# Patient Record
Sex: FEMALE | Race: WHITE | NOT HISPANIC OR LATINO | Employment: FULL TIME | URBAN - METROPOLITAN AREA
[De-identification: names, ages, dates, MRNs, and addresses within clinical notes are randomized per-mention and may not be internally consistent; named-entity substitution may affect disease eponyms.]

---

## 2017-05-08 ENCOUNTER — HOSPITAL ENCOUNTER (OUTPATIENT)
Dept: SLEEP MEDICINE | Facility: HOSPITAL | Age: 32
Discharge: HOME | End: 2017-05-08
Attending: INTERNAL MEDICINE | Admitting: INTERNAL MEDICINE

## 2017-05-08 ENCOUNTER — HOSPITAL ENCOUNTER (OUTPATIENT)
Dept: CT IMAGING | Facility: HOSPITAL | Age: 32
Discharge: HOME | End: 2017-05-08
Attending: INTERNAL MEDICINE | Admitting: INTERNAL MEDICINE

## 2017-06-05 ENCOUNTER — HOSPITAL ENCOUNTER (OUTPATIENT)
Dept: SLEEP MEDICINE | Facility: HOSPITAL | Age: 32
Discharge: HOME | End: 2017-06-05
Attending: INTERNAL MEDICINE | Admitting: INTERNAL MEDICINE

## 2017-07-20 ENCOUNTER — OFFICE (AMBULATORY)
Dept: URBAN - METROPOLITAN AREA CLINIC 64 | Facility: CLINIC | Age: 32
End: 2017-07-20
Payer: COMMERCIAL

## 2017-07-20 VITALS
DIASTOLIC BLOOD PRESSURE: 69 MMHG | HEART RATE: 70 BPM | HEIGHT: 68 IN | WEIGHT: 139 LBS | SYSTOLIC BLOOD PRESSURE: 104 MMHG

## 2017-07-20 DIAGNOSIS — R11.2 NAUSEA WITH VOMITING, UNSPECIFIED: ICD-10-CM

## 2017-07-20 DIAGNOSIS — R19.5 OTHER FECAL ABNORMALITIES: ICD-10-CM

## 2017-07-20 DIAGNOSIS — R63.4 ABNORMAL WEIGHT LOSS: ICD-10-CM

## 2017-07-20 DIAGNOSIS — R19.7 DIARRHEA, UNSPECIFIED: ICD-10-CM

## 2017-07-20 DIAGNOSIS — R10.10 UPPER ABDOMINAL PAIN, UNSPECIFIED: ICD-10-CM

## 2017-07-20 LAB
AMYLASE, SERUM: 48 U/L (ref 31–124)
C-REACTIVE PROTEIN, QUANT: 0.7 MG/L (ref 0–4.9)
CBC WITH DIFFERENTIAL/PLATELET: BASO (ABSOLUTE): 0 X10E3/UL (ref 0–0.2)
CBC WITH DIFFERENTIAL/PLATELET: BASOS: 0 %
CBC WITH DIFFERENTIAL/PLATELET: EOS (ABSOLUTE): 0.1 X10E3/UL (ref 0–0.4)
CBC WITH DIFFERENTIAL/PLATELET: EOS: 1 %
CBC WITH DIFFERENTIAL/PLATELET: HEMATOCRIT: 36.3 % (ref 34–46.6)
CBC WITH DIFFERENTIAL/PLATELET: HEMATOLOGY COMMENTS: (no result)
CBC WITH DIFFERENTIAL/PLATELET: HEMOGLOBIN: 12.4 G/DL (ref 11.1–15.9)
CBC WITH DIFFERENTIAL/PLATELET: IMMATURE CELLS: (no result)
CBC WITH DIFFERENTIAL/PLATELET: IMMATURE GRANS (ABS): 0 X10E3/UL (ref 0–0.1)
CBC WITH DIFFERENTIAL/PLATELET: IMMATURE GRANULOCYTES: 0 %
CBC WITH DIFFERENTIAL/PLATELET: LYMPHS (ABSOLUTE): 3.6 X10E3/UL — HIGH (ref 0.7–3.1)
CBC WITH DIFFERENTIAL/PLATELET: LYMPHS: 36 %
CBC WITH DIFFERENTIAL/PLATELET: MCH: 30.1 PG (ref 26.6–33)
CBC WITH DIFFERENTIAL/PLATELET: MCHC: 34.2 G/DL (ref 31.5–35.7)
CBC WITH DIFFERENTIAL/PLATELET: MCV: 88 FL (ref 79–97)
CBC WITH DIFFERENTIAL/PLATELET: MONOCYTES(ABSOLUTE): 0.5 X10E3/UL (ref 0.1–0.9)
CBC WITH DIFFERENTIAL/PLATELET: MONOCYTES: 6 %
CBC WITH DIFFERENTIAL/PLATELET: NEUTROPHILS (ABSOLUTE): 5.6 X10E3/UL (ref 1.4–7)
CBC WITH DIFFERENTIAL/PLATELET: NEUTROPHILS: 57 %
CBC WITH DIFFERENTIAL/PLATELET: NRBC: (no result)
CBC WITH DIFFERENTIAL/PLATELET: PLATELETS: 316 X10E3/UL (ref 150–379)
CBC WITH DIFFERENTIAL/PLATELET: RBC: 4.12 X10E6/UL (ref 3.77–5.28)
CBC WITH DIFFERENTIAL/PLATELET: RDW: 13.2 % (ref 12.3–15.4)
CBC WITH DIFFERENTIAL/PLATELET: WBC: 9.9 X10E3/UL (ref 3.4–10.8)
COMP. METABOLIC PANEL (14): A/G RATIO: 1.7 (ref 1.2–2.2)
COMP. METABOLIC PANEL (14): ALBUMIN, SERUM: 4.8 G/DL (ref 3.5–5.5)
COMP. METABOLIC PANEL (14): ALKALINE PHOSPHATASE, S: 45 IU/L (ref 39–117)
COMP. METABOLIC PANEL (14): ALT (SGPT): 18 IU/L (ref 0–32)
COMP. METABOLIC PANEL (14): AST (SGOT): 20 IU/L (ref 0–40)
COMP. METABOLIC PANEL (14): BILIRUBIN, TOTAL: 1.1 MG/DL (ref 0–1.2)
COMP. METABOLIC PANEL (14): BUN/CREATININE RATIO: 15 (ref 9–23)
COMP. METABOLIC PANEL (14): BUN: 8 MG/DL (ref 6–20)
COMP. METABOLIC PANEL (14): CALCIUM, SERUM: 9 MG/DL (ref 8.7–10.2)
COMP. METABOLIC PANEL (14): CARBON DIOXIDE, TOTAL: 21 MMOL/L (ref 18–29)
COMP. METABOLIC PANEL (14): CHLORIDE, SERUM: 99 MMOL/L (ref 96–106)
COMP. METABOLIC PANEL (14): CREATININE, SERUM: 0.53 MG/DL — LOW (ref 0.57–1)
COMP. METABOLIC PANEL (14): EGFR IF AFRICN AM: 148 ML/MIN/1.73 (ref 59–?)
COMP. METABOLIC PANEL (14): EGFR IF NONAFRICN AM: 129 ML/MIN/1.73 (ref 59–?)
COMP. METABOLIC PANEL (14): GLOBULIN, TOTAL: 2.8 G/DL (ref 1.5–4.5)
COMP. METABOLIC PANEL (14): GLUCOSE, SERUM: 86 MG/DL (ref 65–99)
COMP. METABOLIC PANEL (14): POTASSIUM, SERUM: 4.4 MMOL/L (ref 3.5–5.2)
COMP. METABOLIC PANEL (14): PROTEIN, TOTAL, SERUM: 7.6 G/DL (ref 6–8.5)
COMP. METABOLIC PANEL (14): SODIUM, SERUM: 139 MMOL/L (ref 134–144)
LIPASE, SERUM: 24 U/L (ref 0–59)

## 2017-07-20 PROCEDURE — 99243 OFF/OP CNSLTJ NEW/EST LOW 30: CPT | Performed by: NURSE PRACTITIONER

## 2017-07-20 RX ORDER — OMEPRAZOLE 40 MG/1
40 CAPSULE, DELAYED RELEASE ORAL
Qty: 30 | Refills: 11 | Status: COMPLETED
Start: 2017-07-20 | End: 2019-03-27

## 2017-07-20 RX ORDER — COLESTIPOL HYDROCHLORIDE 1 G/1
2 TABLET, FILM COATED ORAL
Qty: 60 | Refills: 11 | Status: COMPLETED
Start: 2017-07-20 | End: 2019-03-27

## 2017-07-21 ENCOUNTER — ON CAMPUS - OUTPATIENT (AMBULATORY)
Dept: URBAN - METROPOLITAN AREA HOSPITAL 2 | Facility: HOSPITAL | Age: 32
End: 2017-07-21
Payer: COMMERCIAL

## 2017-07-21 ENCOUNTER — OFFICE (AMBULATORY)
Dept: URBAN - METROPOLITAN AREA CLINIC 64 | Facility: CLINIC | Age: 32
End: 2017-07-21
Payer: COMMERCIAL

## 2017-07-21 VITALS
OXYGEN SATURATION: 98 % | SYSTOLIC BLOOD PRESSURE: 104 MMHG | TEMPERATURE: 99.2 F | DIASTOLIC BLOOD PRESSURE: 72 MMHG | HEART RATE: 75 BPM | RESPIRATION RATE: 12 BRPM | RESPIRATION RATE: 18 BRPM | SYSTOLIC BLOOD PRESSURE: 120 MMHG | DIASTOLIC BLOOD PRESSURE: 65 MMHG | DIASTOLIC BLOOD PRESSURE: 64 MMHG | DIASTOLIC BLOOD PRESSURE: 53 MMHG | HEART RATE: 72 BPM | RESPIRATION RATE: 16 BRPM | HEART RATE: 87 BPM | HEART RATE: 79 BPM | DIASTOLIC BLOOD PRESSURE: 62 MMHG | SYSTOLIC BLOOD PRESSURE: 102 MMHG | WEIGHT: 135 LBS | SYSTOLIC BLOOD PRESSURE: 87 MMHG | OXYGEN SATURATION: 100 % | SYSTOLIC BLOOD PRESSURE: 93 MMHG | HEART RATE: 71 BPM | HEIGHT: 68 IN | OXYGEN SATURATION: 99 %

## 2017-07-21 DIAGNOSIS — R11.2 NAUSEA WITH VOMITING, UNSPECIFIED: ICD-10-CM

## 2017-07-21 DIAGNOSIS — R63.4 ABNORMAL WEIGHT LOSS: ICD-10-CM

## 2017-07-21 DIAGNOSIS — K29.70 GASTRITIS, UNSPECIFIED, WITHOUT BLEEDING: ICD-10-CM

## 2017-07-21 DIAGNOSIS — R19.5 OTHER FECAL ABNORMALITIES: ICD-10-CM

## 2017-07-21 DIAGNOSIS — R10.10 UPPER ABDOMINAL PAIN, UNSPECIFIED: ICD-10-CM

## 2017-07-21 LAB
GI HISTOLOGY: A. SELECT: (no result)
GI HISTOLOGY: PDF REPORT: (no result)

## 2017-07-21 PROCEDURE — 43239 EGD BIOPSY SINGLE/MULTIPLE: CPT | Performed by: INTERNAL MEDICINE

## 2017-07-21 PROCEDURE — 88305 TISSUE EXAM BY PATHOLOGIST: CPT | Performed by: INTERNAL MEDICINE

## 2017-07-21 RX ADMIN — PROPOFOL: 10 INJECTION, EMULSION INTRAVENOUS at 11:12

## 2017-07-24 ENCOUNTER — HOSPITAL ENCOUNTER (OUTPATIENT)
Dept: NUCLEAR MEDICINE | Facility: HOSPITAL | Age: 32
Discharge: HOME | End: 2017-07-24
Attending: INTERNAL MEDICINE | Admitting: INTERNAL MEDICINE

## 2017-08-17 ENCOUNTER — ON CAMPUS - OUTPATIENT (AMBULATORY)
Dept: URBAN - METROPOLITAN AREA HOSPITAL 2 | Facility: HOSPITAL | Age: 32
End: 2017-08-17
Payer: COMMERCIAL

## 2017-08-17 VITALS
DIASTOLIC BLOOD PRESSURE: 53 MMHG | HEART RATE: 103 BPM | SYSTOLIC BLOOD PRESSURE: 83 MMHG | OXYGEN SATURATION: 95 % | TEMPERATURE: 97.8 F | SYSTOLIC BLOOD PRESSURE: 87 MMHG | SYSTOLIC BLOOD PRESSURE: 123 MMHG | SYSTOLIC BLOOD PRESSURE: 124 MMHG | DIASTOLIC BLOOD PRESSURE: 60 MMHG | RESPIRATION RATE: 15 BRPM | HEART RATE: 101 BPM | SYSTOLIC BLOOD PRESSURE: 86 MMHG | SYSTOLIC BLOOD PRESSURE: 99 MMHG | DIASTOLIC BLOOD PRESSURE: 56 MMHG | HEART RATE: 91 BPM | DIASTOLIC BLOOD PRESSURE: 69 MMHG | DIASTOLIC BLOOD PRESSURE: 61 MMHG | HEART RATE: 85 BPM | HEART RATE: 94 BPM | DIASTOLIC BLOOD PRESSURE: 82 MMHG | SYSTOLIC BLOOD PRESSURE: 95 MMHG | HEART RATE: 95 BPM | WEIGHT: 135 LBS | HEART RATE: 88 BPM | DIASTOLIC BLOOD PRESSURE: 57 MMHG | RESPIRATION RATE: 18 BRPM | HEART RATE: 97 BPM | HEART RATE: 77 BPM | HEIGHT: 68 IN | OXYGEN SATURATION: 99 % | RESPIRATION RATE: 16 BRPM | OXYGEN SATURATION: 98 % | DIASTOLIC BLOOD PRESSURE: 68 MMHG | OXYGEN SATURATION: 97 % | OXYGEN SATURATION: 96 %

## 2017-08-17 DIAGNOSIS — K64.8 OTHER HEMORRHOIDS: ICD-10-CM

## 2017-08-17 DIAGNOSIS — K59.1 FUNCTIONAL DIARRHEA: ICD-10-CM

## 2017-08-17 LAB
GI HISTOLOGY: A. UNSPECIFIED: (no result)
GI HISTOLOGY: PDF REPORT: (no result)

## 2017-08-17 PROCEDURE — 45380 COLONOSCOPY AND BIOPSY: CPT | Performed by: INTERNAL MEDICINE

## 2017-08-17 RX ADMIN — PROPOFOL: 10 INJECTION, EMULSION INTRAVENOUS at 13:09

## 2017-12-30 ENCOUNTER — HOSPITAL ENCOUNTER (OUTPATIENT)
Dept: RESPIRATORY THERAPY | Facility: HOSPITAL | Age: 32
Discharge: HOME | End: 2017-12-30
Attending: INTERNAL MEDICINE | Admitting: INTERNAL MEDICINE

## 2018-01-26 ENCOUNTER — HOSPITAL ENCOUNTER (OUTPATIENT)
Dept: CARDIOLOGY | Facility: HOSPITAL | Age: 33
Discharge: HOME | End: 2018-01-26
Attending: INTERNAL MEDICINE | Admitting: INTERNAL MEDICINE

## 2018-05-21 ENCOUNTER — INPATIENT HOSPITAL (AMBULATORY)
Dept: URBAN - METROPOLITAN AREA HOSPITAL 84 | Facility: HOSPITAL | Age: 33
End: 2018-05-21
Payer: COMMERCIAL

## 2018-05-21 DIAGNOSIS — R63.4 ABNORMAL WEIGHT LOSS: ICD-10-CM

## 2018-05-21 DIAGNOSIS — R10.9 UNSPECIFIED ABDOMINAL PAIN: ICD-10-CM

## 2018-05-21 DIAGNOSIS — R11.2 NAUSEA WITH VOMITING, UNSPECIFIED: ICD-10-CM

## 2018-05-21 DIAGNOSIS — K31.84 GASTROPARESIS: ICD-10-CM

## 2018-05-21 PROCEDURE — 99254 IP/OBS CNSLTJ NEW/EST MOD 60: CPT | Performed by: NURSE PRACTITIONER

## 2018-05-22 ENCOUNTER — INPATIENT HOSPITAL (AMBULATORY)
Dept: URBAN - METROPOLITAN AREA HOSPITAL 84 | Facility: HOSPITAL | Age: 33
End: 2018-05-22
Payer: COMMERCIAL

## 2018-05-22 DIAGNOSIS — R11.2 NAUSEA WITH VOMITING, UNSPECIFIED: ICD-10-CM

## 2018-05-22 DIAGNOSIS — R10.9 UNSPECIFIED ABDOMINAL PAIN: ICD-10-CM

## 2018-05-22 DIAGNOSIS — K31.84 GASTROPARESIS: ICD-10-CM

## 2018-05-22 DIAGNOSIS — R63.4 ABNORMAL WEIGHT LOSS: ICD-10-CM

## 2018-05-22 PROCEDURE — 99231 SBSQ HOSP IP/OBS SF/LOW 25: CPT | Performed by: NURSE PRACTITIONER

## 2018-09-13 ENCOUNTER — TRANSCRIBE ORDERS (OUTPATIENT)
Dept: CARDIOLOGY | Facility: CLINIC | Age: 33
End: 2018-09-13

## 2019-03-27 ENCOUNTER — OFFICE (AMBULATORY)
Dept: URBAN - METROPOLITAN AREA CLINIC 64 | Facility: CLINIC | Age: 34
End: 2019-03-27

## 2019-03-27 VITALS
HEART RATE: 95 BPM | WEIGHT: 160 LBS | HEIGHT: 68 IN | SYSTOLIC BLOOD PRESSURE: 100 MMHG | DIASTOLIC BLOOD PRESSURE: 74 MMHG

## 2019-03-27 DIAGNOSIS — K59.00 CONSTIPATION, UNSPECIFIED: ICD-10-CM

## 2019-03-27 DIAGNOSIS — R11.2 NAUSEA WITH VOMITING, UNSPECIFIED: ICD-10-CM

## 2019-03-27 DIAGNOSIS — R19.6 HALITOSIS: ICD-10-CM

## 2019-03-27 DIAGNOSIS — R10.11 RIGHT UPPER QUADRANT PAIN: ICD-10-CM

## 2019-03-27 PROCEDURE — 99214 OFFICE O/P EST MOD 30 MIN: CPT | Performed by: NURSE PRACTITIONER

## 2019-03-27 RX ORDER — DEXLANSOPRAZOLE 60 MG/1
60 CAPSULE, DELAYED RELEASE ORAL
Qty: 90 | Refills: 3 | Status: COMPLETED
End: 2023-04-21

## 2019-03-27 RX ORDER — HYOSCYAMINE SULFATE 0.12 MG/1
0.5 TABLET ORAL; SUBLINGUAL
Qty: 60 | Refills: 1 | Status: COMPLETED
Start: 2019-03-27 | End: 2023-04-21

## 2019-04-04 ENCOUNTER — ON CAMPUS - OUTPATIENT (AMBULATORY)
Dept: URBAN - METROPOLITAN AREA HOSPITAL 2 | Facility: HOSPITAL | Age: 34
End: 2019-04-04

## 2019-04-04 ENCOUNTER — OFFICE (AMBULATORY)
Dept: URBAN - METROPOLITAN AREA PATHOLOGY 4 | Facility: PATHOLOGY | Age: 34
End: 2019-04-04

## 2019-04-04 VITALS
DIASTOLIC BLOOD PRESSURE: 74 MMHG | SYSTOLIC BLOOD PRESSURE: 119 MMHG | HEART RATE: 70 BPM | SYSTOLIC BLOOD PRESSURE: 103 MMHG | WEIGHT: 159.4 LBS | OXYGEN SATURATION: 99 % | TEMPERATURE: 97.4 F | OXYGEN SATURATION: 100 % | HEART RATE: 76 BPM | SYSTOLIC BLOOD PRESSURE: 124 MMHG | HEART RATE: 78 BPM | OXYGEN SATURATION: 98 % | RESPIRATION RATE: 16 BRPM | DIASTOLIC BLOOD PRESSURE: 29 MMHG | HEART RATE: 72 BPM | SYSTOLIC BLOOD PRESSURE: 108 MMHG | HEIGHT: 68 IN | DIASTOLIC BLOOD PRESSURE: 83 MMHG | DIASTOLIC BLOOD PRESSURE: 61 MMHG | HEART RATE: 81 BPM | OXYGEN SATURATION: 97 % | DIASTOLIC BLOOD PRESSURE: 72 MMHG

## 2019-04-04 DIAGNOSIS — K29.50 UNSPECIFIED CHRONIC GASTRITIS WITHOUT BLEEDING: ICD-10-CM

## 2019-04-04 DIAGNOSIS — K29.70 GASTRITIS, UNSPECIFIED, WITHOUT BLEEDING: ICD-10-CM

## 2019-04-04 DIAGNOSIS — R11.2 NAUSEA WITH VOMITING, UNSPECIFIED: ICD-10-CM

## 2019-04-04 DIAGNOSIS — R13.10 DYSPHAGIA, UNSPECIFIED: ICD-10-CM

## 2019-04-04 DIAGNOSIS — K31.9 DISEASE OF STOMACH AND DUODENUM, UNSPECIFIED: ICD-10-CM

## 2019-04-04 LAB
GI HISTOLOGY: A. SELECT: (no result)
GI HISTOLOGY: PDF REPORT: (no result)

## 2019-04-04 PROCEDURE — 88305 TISSUE EXAM BY PATHOLOGIST: CPT | Performed by: INTERNAL MEDICINE

## 2019-04-04 PROCEDURE — 88342 IMHCHEM/IMCYTCHM 1ST ANTB: CPT | Performed by: INTERNAL MEDICINE

## 2019-04-04 PROCEDURE — 43450 DILATE ESOPHAGUS 1/MULT PASS: CPT | Performed by: INTERNAL MEDICINE

## 2019-04-04 PROCEDURE — 43239 EGD BIOPSY SINGLE/MULTIPLE: CPT | Performed by: INTERNAL MEDICINE

## 2019-04-04 NOTE — SERVICEHPINOTES
SANDRA OROZCO  is a  33  female   who presents today for a  EGD   for   the indications listed below. The updated Patient Profile was reviewed prior to the procedure, in conjunction with the Physical Exam, including medical conditions, surgical procedures, medications, allergies, family history and social history. See Physical Exam time stamp below for date and time of HPI completion.Pre-operatively, I reviewed the indication(s) for the procedure, the risks of the procedure [including but not limited to: unexpected bleeding possibly requiring hospitalization and/or unplanned repeat procedures, perforation possibly requiring surgical treatment, missed lesions and complications of sedation/MAC (also explained by anesthesia staff)]. I have evaluated the patient for risks associated with the planned anesthesia and the procedure to be performed and find the patient an acceptable candidate for IV sedation.Multiple opportunities were provided for any questions or concerns, and all questions were answered satisfactorily before any anesthesia was administered. We will proceed with the planned procedure.BR

## 2019-10-04 ENCOUNTER — OFFICE (AMBULATORY)
Dept: URBAN - METROPOLITAN AREA CLINIC 64 | Facility: CLINIC | Age: 34
End: 2019-10-04

## 2019-10-04 VITALS
HEIGHT: 68 IN | SYSTOLIC BLOOD PRESSURE: 112 MMHG | DIASTOLIC BLOOD PRESSURE: 82 MMHG | HEART RATE: 98 BPM | WEIGHT: 141 LBS

## 2019-10-04 DIAGNOSIS — R11.2 NAUSEA WITH VOMITING, UNSPECIFIED: ICD-10-CM

## 2019-10-04 DIAGNOSIS — R10.11 RIGHT UPPER QUADRANT PAIN: ICD-10-CM

## 2019-10-04 DIAGNOSIS — K59.1 FUNCTIONAL DIARRHEA: ICD-10-CM

## 2019-10-04 PROCEDURE — 99215 OFFICE O/P EST HI 40 MIN: CPT | Performed by: NURSE PRACTITIONER

## 2019-10-04 RX ORDER — SUCRALFATE 1 G/10ML
800 SUSPENSION ORAL
Qty: 800 | Refills: 0 | Status: COMPLETED
Start: 2019-10-04 | End: 2023-04-21

## 2019-10-04 RX ORDER — COLESEVELAM HYDROCHLORIDE 625 MG/1
1875 TABLET, FILM COATED ORAL
Qty: 30 | Refills: 11 | Status: COMPLETED
Start: 2019-10-04 | End: 2023-04-21

## 2019-10-04 RX ORDER — AMITRIPTYLINE HYDROCHLORIDE 25 MG/1
25 TABLET, FILM COATED ORAL
Qty: 30 | Refills: 5 | Status: COMPLETED
Start: 2019-10-04 | End: 2023-04-21

## 2019-10-08 ENCOUNTER — OFFICE VISIT (OUTPATIENT)
Dept: SURGERY | Facility: CLINIC | Age: 34
End: 2019-10-08

## 2019-10-08 VITALS
OXYGEN SATURATION: 100 % | SYSTOLIC BLOOD PRESSURE: 112 MMHG | DIASTOLIC BLOOD PRESSURE: 77 MMHG | TEMPERATURE: 97.5 F | HEIGHT: 67 IN | BODY MASS INDEX: 22.44 KG/M2 | HEART RATE: 84 BPM | WEIGHT: 143 LBS

## 2019-10-08 DIAGNOSIS — R10.11 RIGHT UPPER QUADRANT ABDOMINAL PAIN: Primary | ICD-10-CM

## 2019-10-08 PROBLEM — R41.0 TRANSIENT CONFUSION: Status: ACTIVE | Noted: 2019-10-08

## 2019-10-08 PROCEDURE — 99214 OFFICE O/P EST MOD 30 MIN: CPT | Performed by: SURGERY

## 2019-10-08 RX ORDER — DICYCLOMINE HCL 20 MG
TABLET ORAL
COMMUNITY
Start: 2019-09-09

## 2019-10-08 RX ORDER — DULOXETIN HYDROCHLORIDE 60 MG/1
60 CAPSULE, DELAYED RELEASE ORAL DAILY
COMMUNITY
Start: 2018-01-18

## 2019-10-08 RX ORDER — ZOLPIDEM TARTRATE 5 MG/1
5 TABLET ORAL
COMMUNITY

## 2019-10-08 RX ORDER — PANTOPRAZOLE SODIUM 40 MG/1
40 TABLET, DELAYED RELEASE ORAL DAILY
COMMUNITY
Start: 2019-10-01 | End: 2019-10-31

## 2019-10-08 RX ORDER — OXCARBAZEPINE 300 MG/1
TABLET ORAL
COMMUNITY
Start: 2019-10-03

## 2019-10-08 RX ORDER — THEOPHYLLINE 300 MG/1
TABLET, EXTENDED RELEASE ORAL
COMMUNITY
Start: 2018-02-06

## 2019-10-08 RX ORDER — PROMETHAZINE HYDROCHLORIDE 25 MG/1
25 TABLET ORAL EVERY 6 HOURS PRN
COMMUNITY
Start: 2018-06-07

## 2019-10-08 RX ORDER — DIVALPROEX SODIUM 500 MG/1
TABLET, EXTENDED RELEASE ORAL
COMMUNITY
Start: 2019-09-30 | End: 2020-10-21 | Stop reason: SDUPTHER

## 2019-10-08 RX ORDER — SUCRALFATE 1 G/10ML
SUSPENSION ORAL
COMMUNITY
Start: 2019-10-06

## 2019-10-08 RX ORDER — ALPRAZOLAM 0.25 MG/1
TABLET ORAL AS NEEDED
COMMUNITY
Start: 2019-08-26

## 2019-10-08 RX ORDER — MESALAMINE 375 MG/1
CAPSULE, EXTENDED RELEASE ORAL
COMMUNITY
Start: 2019-09-30 | End: 2020-07-29

## 2019-10-08 RX ORDER — AMITRIPTYLINE HYDROCHLORIDE 25 MG/1
TABLET, FILM COATED ORAL
COMMUNITY
Start: 2019-10-04

## 2019-10-08 RX ORDER — DICLOFENAC POTASSIUM 50 MG/1
50 POWDER, FOR SOLUTION ORAL AS NEEDED
COMMUNITY
Start: 2018-10-24 | End: 2020-07-29

## 2019-10-08 RX ORDER — MONTELUKAST SODIUM 10 MG/1
TABLET ORAL
COMMUNITY
Start: 2018-01-18

## 2019-10-08 NOTE — PROGRESS NOTES
"Subjective   Brandi Martinez is a 33 y.o. female.   Chief Complaint   Patient presents with   • Abdominal Pain     Right upper quadrant pain, nausea & vomiting     /77   Pulse 84   Temp 97.5 °F (36.4 °C) (Oral)   Ht 170.2 cm (67.01\")   Wt 64.9 kg (143 lb)   SpO2 100%   BMI 22.39 kg/m²     HISTORY OF PRESENT ILLNESS:  33-year-old lady known to me from a diagnostic laparoscopy last year for right lower quadrant abdominal pain who has been referred back to me for reevaluation.  She has a many year history of right upper quadrant abdominal pain associated with nausea and vomiting and has had upper and lower endoscopy.  She has been diagnosed with gastritis in the past and also has had an emptying study demonstrating gastroparesis.  She has had a cholecystectomy I believe in 2017 by Dr. Wallis and endorses no improvement in her symptoms.  She was told that the gallbladder was normal on pathologic review.  She has had a hysterectomy and last year she was admitted to the hospital for abdominal pain nausea and vomiting and she was found to have a fluid-filled structure in the right lower quadrant so I took her to the operating room for diagnostic laparoscopy removal of a hydrosalpinx incidental appendectomy and did not see any other explanation that could be contributing to her pain.  After her cholecystectomy and her diagnostic laparoscopy removal of hydrosalpinx and appendectomy she denies improvement in her symptoms.    Over the last couple of months she has had worsening of her pain.  She describes it as crippling.  She says it is spontaneous causing her to double over.  It feels like stabbing.  It used to eased up but at this point rarely eases up and is causing chronic abdominal pain.  It begins in the right upper quadrant and radiates down to her right lower quadrant.  She has been unable to eat much food at all she says every time she eats she spits up phone.  She is having loose bowel movements usually " 1-2 times daily.  She has fevers and chills every 3 days.  This pain is also exacerbated by standing.  She had a work-up at [facility] which I only have the comments on from the gastroenterology note which suggest that a CT was performed in early October demonstrated no acute processes other than a tiny hypodense lesion in the liver.  I have been asked to see her again to  her about another diagnostic laparoscopy.      Outpatient Encounter Medications as of 10/8/2019   Medication Sig Dispense Refill   • ALPRAZolam (XANAX) 0.25 MG tablet As Needed.     • amitriptyline (ELAVIL) 25 MG tablet      • APRISO 0.375 g 24 hr capsule      • CARAFATE 1 GM/10ML suspension      • Cetirizine HCl (ZYRTEC ALLERGY PO) As Needed.     • Diclofenac Potassium 50 MG pack Take 50 mg by mouth As Needed.     • dicyclomine (BENTYL) 20 MG tablet      • divalproex (DEPAKOTE) 500 MG 24 hr tablet TAKE 1 TABLET DAILY     • DULoxetine (CYMBALTA) 60 MG capsule Take 60 mg by mouth Daily.     • Erenumab-aooe (AIMOVIG) 70 MG/ML prefilled syringe Inject 70 mg under the skin into the appropriate area as directed.     • montelukast (SINGULAIR) 10 MG tablet TAKE 1 TABLET BY MOUTH EVERY DAY     • OXTELLAR  MG tablet sustained-release 24 hour      • pantoprazole (PROTONIX) 40 MG EC tablet Take 40 mg by mouth Daily.     • promethazine (PHENERGAN) 25 MG tablet Take 25 mg by mouth Every 6 (Six) Hours As Needed.     • theophylline (THEODUR) 300 MG 12 hr tablet TAKE 1 TABLET BY MOUTH EVERY DAY     • tiotropium bromide-olodaterol (STIOLTO RESPIMAT) 2.5-2.5 MCG/ACT aerosol solution inhaler INHALE 2 PUFFS BY MOUTH EVERY DAY     • trimethobenzamide (TIGAN) 300 MG capsule      • zolpidem (AMBIEN) 5 MG tablet Take 5 mg by mouth.       No facility-administered encounter medications on file as of 10/8/2019.          The following portions of the patient's history were reviewed and updated as appropriate: allergies, current medications, past family  history, past medical history, past social history, past surgical history and problem list.    Review of Systems  A complete review of systems been obtained is positive for dizziness loss of weight poor appetite bloating bowel changes diarrhea excessive thirst nausea abdominal pain vomiting back pain and the remainder of the review of systems is negative.  Objective   Physical Exam   Constitutional:   Thin but in no distress   HENT:   Head: Normocephalic and atraumatic.   Eyes: Conjunctivae are normal. No scleral icterus.   Neck: No tracheal deviation present.   Cardiovascular: Normal rate and intact distal pulses.   Pulmonary/Chest: Effort normal. No respiratory distress.   Abdominal: Soft. She exhibits no distension.   Tenderness to palpation along the liver edge in the right upper quadrant the abdomen is soft with no evidence of hernia   Musculoskeletal: She exhibits no edema or deformity.   Lymphadenopathy:     She has no cervical adenopathy.   Neurological: She is alert. No cranial nerve deficit.   Skin: Skin is warm and dry.   Psychiatric: She has a normal mood and affect. Her behavior is normal.         Assessment/Plan   Brandi was seen today for abdominal pain.    Diagnoses and all orders for this visit:    Right upper quadrant abdominal pain    Known history of gastroparesis status post cholecystectomy without improvement in nausea and vomiting status post diagnostic laparoscopy appendectomy removal of hydrosalpinx without improvement in her symptoms.  She does have a known history of alpha-1 antitrypsin deficiency.  Gastroenterology recommending referral to a tertiary center for ongoing work-up and I agree.  I do not think additional laparoscopy or laparotomy would benefit.  At the time of her last laparoscopy I do not see any evidence of endometriosis.  Alternative diagnoses could be sphincter of Oddi dysfunction and she has a pending MRCP.  Alternatively if this is related to gastroparesis I have  recommended seeking consultation with the [facility] gastroenterology group including Dr. Wiley for additional diagnostic work-up.    Raghavendra Metzger MD  10/8/2019  9:51 AM

## 2020-02-18 ENCOUNTER — LAB REQUISITION (OUTPATIENT)
Dept: LAB | Facility: HOSPITAL | Age: 35
End: 2020-02-18

## 2020-02-18 DIAGNOSIS — R10.2 PELVIC AND PERINEAL PAIN: ICD-10-CM

## 2020-02-18 PROCEDURE — 88305 TISSUE EXAM BY PATHOLOGIST: CPT | Performed by: OBSTETRICS & GYNECOLOGY

## 2020-02-19 LAB
LAB AP CASE REPORT: NORMAL
PATH REPORT.FINAL DX SPEC: NORMAL
PATH REPORT.GROSS SPEC: NORMAL

## 2020-03-24 ENCOUNTER — APPOINTMENT (OUTPATIENT)
Dept: GENERAL RADIOLOGY | Facility: HOSPITAL | Age: 35
End: 2020-03-24

## 2020-03-24 ENCOUNTER — HOSPITAL ENCOUNTER (EMERGENCY)
Facility: HOSPITAL | Age: 35
Discharge: HOME | End: 2020-03-24
Attending: EMERGENCY MEDICINE | Admitting: EMERGENCY MEDICINE

## 2020-03-24 VITALS
RESPIRATION RATE: 15 BRPM | HEIGHT: 70 IN | OXYGEN SATURATION: 99 % | BODY MASS INDEX: 21.46 KG/M2 | HEART RATE: 82 BPM | TEMPERATURE: 98 F | WEIGHT: 149.91 LBS | SYSTOLIC BLOOD PRESSURE: 128 MMHG | DIASTOLIC BLOOD PRESSURE: 87 MMHG

## 2020-03-24 DIAGNOSIS — J18.9 PNEUMONIA DUE TO INFECTIOUS ORGANISM, UNSPECIFIED LATERALITY, UNSPECIFIED PART OF LUNG: ICD-10-CM

## 2020-03-24 DIAGNOSIS — R06.00 DYSPNEA, UNSPECIFIED TYPE: Primary | ICD-10-CM

## 2020-03-24 LAB

## 2020-03-24 PROCEDURE — 71045 X-RAY EXAM CHEST 1 VIEW: CPT

## 2020-03-24 PROCEDURE — 0099U HC BIOFIRE FILMARRAY RESP PANEL 1: CPT | Performed by: EMERGENCY MEDICINE

## 2020-03-24 PROCEDURE — 99284 EMERGENCY DEPT VISIT MOD MDM: CPT

## 2020-03-24 PROCEDURE — U0003 INFECTIOUS AGENT DETECTION BY NUCLEIC ACID (DNA OR RNA); SEVERE ACUTE RESPIRATORY SYNDROME CORONAVIRUS 2 (SARS-COV-2) (CORONAVIRUS DISEASE [COVID-19]), AMPLIFIED PROBE TECHNIQUE, MAKING USE OF HIGH THROUGHPUT TECHNOLOGIES AS DESCRIBED BY CMS-2020-01-R: HCPCS | Performed by: EMERGENCY MEDICINE

## 2020-03-24 PROCEDURE — 87635 SARS-COV-2 COVID-19 AMP PRB: CPT | Performed by: EMERGENCY MEDICINE

## 2020-03-24 RX ORDER — AZITHROMYCIN 250 MG/1
TABLET, FILM COATED ORAL
Qty: 6 TABLET | Refills: 0 | Status: SHIPPED | OUTPATIENT
Start: 2020-03-24 | End: 2020-07-29

## 2020-04-04 LAB — SARS-COV-2 RNA RESP QL NAA+PROBE: NOT DETECTED

## 2020-04-09 ENCOUNTER — HOSPITAL ENCOUNTER (EMERGENCY)
Facility: HOSPITAL | Age: 35
Discharge: HOME | End: 2020-04-09
Admitting: EMERGENCY MEDICINE

## 2020-04-09 ENCOUNTER — APPOINTMENT (OUTPATIENT)
Dept: GENERAL RADIOLOGY | Facility: HOSPITAL | Age: 35
End: 2020-04-09

## 2020-04-09 VITALS
HEIGHT: 70 IN | WEIGHT: 145.5 LBS | BODY MASS INDEX: 20.83 KG/M2 | HEART RATE: 79 BPM | RESPIRATION RATE: 16 BRPM | TEMPERATURE: 97.6 F | SYSTOLIC BLOOD PRESSURE: 118 MMHG | OXYGEN SATURATION: 100 % | DIASTOLIC BLOOD PRESSURE: 76 MMHG

## 2020-04-09 DIAGNOSIS — J40 BRONCHITIS: ICD-10-CM

## 2020-04-09 DIAGNOSIS — R50.9 FEVER AND CHILLS: Primary | ICD-10-CM

## 2020-04-09 PROCEDURE — 99283 EMERGENCY DEPT VISIT LOW MDM: CPT

## 2020-04-09 PROCEDURE — 71045 X-RAY EXAM CHEST 1 VIEW: CPT

## 2020-04-09 RX ORDER — PREDNISONE 20 MG/1
20 TABLET ORAL DAILY
Qty: 5 TABLET | Refills: 0 | Status: SHIPPED | OUTPATIENT
Start: 2020-04-09 | End: 2020-07-29

## 2020-04-09 RX ORDER — BROMPHENIRAMINE MALEATE, PSEUDOEPHEDRINE HYDROCHLORIDE, AND DEXTROMETHORPHAN HYDROBROMIDE 2; 30; 10 MG/5ML; MG/5ML; MG/5ML
5 SYRUP ORAL 4 TIMES DAILY PRN
Qty: 120 ML | Refills: 0 | Status: SHIPPED | OUTPATIENT
Start: 2020-04-09 | End: 2020-07-29

## 2020-04-09 NOTE — ED PROVIDER NOTES
Subjective   Patient is a 34-year-old female that states she had a fever of 101 at home and took some Tylenol prior to arrival.  She states that on  she came to the emergency room and was seen and had a negative covid-19 test.  She comes back in today saying that she is still having difficulty breathing and is very anxious she states that she completed her Zithromax that she was prescribed here and was also given Levaquin and Bactrim which she has completed by Dr. Vidal her primary care provider she denies pain at this time          Review of Systems   Constitutional: Positive for fever. Negative for chills and fatigue.   HENT: Positive for congestion. Negative for tinnitus and trouble swallowing.    Eyes: Negative for photophobia, discharge and redness.   Respiratory: Positive for cough and shortness of breath.    Cardiovascular: Negative for chest pain and palpitations.   Gastrointestinal: Negative for abdominal pain, diarrhea, nausea and vomiting.   Genitourinary: Negative for dysuria, frequency and urgency.   Musculoskeletal: Negative for back pain, joint swelling and myalgias.   Skin: Negative for rash.   Neurological: Negative for dizziness and headaches.   Psychiatric/Behavioral: Negative for confusion. The patient is nervous/anxious.    All other systems reviewed and are negative.      Past Medical History:   Diagnosis Date   • Anemia    • Asthma        Allergies   Allergen Reactions   • Morphine Itching, Swelling and Unknown (See Comments)   • Ondansetron Hcl Other (See Comments)   • Ketorolac Other (See Comments)   • Topiramate Unknown (See Comments)     Kidney stones   • Butorphanol Anxiety and Other (See Comments)       Past Surgical History:   Procedure Laterality Date   • APPENDECTOMY      [place]    •  SECTION  2004   •  SECTION     •  SECTION     • HYSTERECTOMY      [facility]   • LAPAROSCOPIC CHOLECYSTECTOMY     • SALPINGECTOMY      [place]        Family History   Problem Relation Age of Onset   • Cancer Mother    • Heart disease Father        Social History     Socioeconomic History   • Marital status:      Spouse name: Not on file   • Number of children: Not on file   • Years of education: Not on file   • Highest education level: Not on file   Tobacco Use   • Smoking status: Current Every Day Smoker   • Smokeless tobacco: Never Used   Substance and Sexual Activity   • Alcohol use: Yes     Comment: rare   • Drug use: No   • Sexual activity: Defer           Objective   Physical Exam   Constitutional: She is oriented to person, place, and time. She appears well-developed and well-nourished.   HENT:   Head: Normocephalic and atraumatic.   Right Ear: External ear normal.   Left Ear: External ear normal.   Nose: Nose normal.   Mouth/Throat: Oropharynx is clear and moist.   Eyes: Pupils are equal, round, and reactive to light. Conjunctivae and EOM are normal.   Neck: Normal range of motion. Neck supple.   Cardiovascular: Normal rate, regular rhythm, normal heart sounds and intact distal pulses.   Pulmonary/Chest: Effort normal. No respiratory distress. She has decreased breath sounds. She has no wheezes.   Abdominal: Soft. Bowel sounds are normal. She exhibits no distension and no mass. There is no tenderness. There is no rebound and no guarding.   Musculoskeletal: Normal range of motion. She exhibits no deformity.   Neurological: She is alert and oriented to person, place, and time. She displays normal reflexes. No cranial nerve deficit or sensory deficit. GCS eye subscore is 4. GCS verbal subscore is 5. GCS motor subscore is 6.   Skin: Skin is warm, dry and intact. Capillary refill takes less than 2 seconds. No rash noted.   Psychiatric: Her speech is normal and behavior is normal. Judgment and thought content normal. Her mood appears anxious. Cognition and memory are normal.   Vitals reviewed.      Procedures           ED Course      /75    "Pulse 86   Temp 97.9 °F (36.6 °C) (Oral)   Resp 16   Ht 177.8 cm (70\")   Wt 66 kg (145 lb 8.1 oz)   SpO2 98%   BMI 20.88 kg/m²   Labs Reviewed - No data to display  Medications - No data to display  No radiology results for the last day                                       MDM  Number of Diagnoses or Management Options  Diagnosis management comments: Appropriate PPE was used in the evaluation of this patient.    Patient had normal oxygen saturation with no oxygen being applied.  The patient is alert oriented nontoxic in no acute distress she is afebrile in the emergency room.  She had a repeat chest x-ray today which is normal as well.  She will be sent home to continue self quarantine for the next 14 days we did discuss retesting for Covid-19 at this time the patient will not be retested.    Patient is alert oriented nontoxic and verbalizes understanding of discharge instructions       Amount and/or Complexity of Data Reviewed  Independent visualization of images, tracings, or specimens: yes    Patient Progress  Patient progress: stable      Final diagnoses:   Fever and chills   Bronchitis            Shannan Baeza, APRN  04/09/20 0156    "

## 2020-04-09 NOTE — ED NOTES
Patient reports being tested for covid-19 and has been experiencing an increase in her symptoms. States her throat is tight and it is hard to get a deep breath in, reports having to increase her oxygen to 4L the last several days.     Natalie Andres, LPN  04/09/20 0122

## 2020-05-01 ENCOUNTER — TRANSCRIBE ORDERS (OUTPATIENT)
Dept: LAB | Facility: HOSPITAL | Age: 35
End: 2020-05-01

## 2020-05-01 ENCOUNTER — TRANSCRIBE ORDERS (OUTPATIENT)
Dept: GENERAL RADIOLOGY | Facility: HOSPITAL | Age: 35
End: 2020-05-01

## 2020-05-01 ENCOUNTER — HOSPITAL ENCOUNTER (OUTPATIENT)
Dept: GENERAL RADIOLOGY | Facility: HOSPITAL | Age: 35
Discharge: HOME | End: 2020-05-01
Admitting: INTERNAL MEDICINE

## 2020-05-01 ENCOUNTER — LAB (OUTPATIENT)
Dept: LAB | Facility: HOSPITAL | Age: 35
End: 2020-05-01

## 2020-05-01 DIAGNOSIS — R09.89 CHEST CONGESTION: ICD-10-CM

## 2020-05-01 DIAGNOSIS — R05.9 COUGH: ICD-10-CM

## 2020-05-01 DIAGNOSIS — R53.83 FATIGUE, UNSPECIFIED TYPE: ICD-10-CM

## 2020-05-01 DIAGNOSIS — R05.9 COUGH: Primary | ICD-10-CM

## 2020-05-01 PROCEDURE — 71046 X-RAY EXAM CHEST 2 VIEWS: CPT

## 2020-05-01 PROCEDURE — U0003 INFECTIOUS AGENT DETECTION BY NUCLEIC ACID (DNA OR RNA); SEVERE ACUTE RESPIRATORY SYNDROME CORONAVIRUS 2 (SARS-COV-2) (CORONAVIRUS DISEASE [COVID-19]), AMPLIFIED PROBE TECHNIQUE, MAKING USE OF HIGH THROUGHPUT TECHNOLOGIES AS DESCRIBED BY CMS-2020-01-R: HCPCS

## 2020-05-03 LAB — SARS-COV-2 RNA RESP QL NAA+PROBE: NOT DETECTED

## 2020-06-29 ENCOUNTER — OFFICE VISIT (OUTPATIENT)
Dept: ORTHOPEDIC SURGERY | Facility: CLINIC | Age: 35
End: 2020-06-29

## 2020-06-29 VITALS
BODY MASS INDEX: 22.05 KG/M2 | HEIGHT: 70 IN | DIASTOLIC BLOOD PRESSURE: 72 MMHG | WEIGHT: 154 LBS | RESPIRATION RATE: 16 BRPM | HEART RATE: 80 BPM | SYSTOLIC BLOOD PRESSURE: 115 MMHG | TEMPERATURE: 97.8 F | OXYGEN SATURATION: 99 %

## 2020-06-29 DIAGNOSIS — M25.461 PAIN AND SWELLING OF KNEE, RIGHT: ICD-10-CM

## 2020-06-29 DIAGNOSIS — M70.50 PES ANSERINE BURSITIS: ICD-10-CM

## 2020-06-29 DIAGNOSIS — M25.561 ACUTE PAIN OF RIGHT KNEE: Primary | ICD-10-CM

## 2020-06-29 DIAGNOSIS — M25.561 PAIN AND SWELLING OF KNEE, RIGHT: ICD-10-CM

## 2020-06-29 PROCEDURE — 99203 OFFICE O/P NEW LOW 30 MIN: CPT | Performed by: FAMILY MEDICINE

## 2020-06-29 RX ORDER — ARFORMOTEROL TARTRATE 15 UG/2ML
SOLUTION RESPIRATORY (INHALATION)
COMMUNITY
Start: 2020-05-01

## 2020-06-29 NOTE — PROGRESS NOTES
Primary Care Sports Medicine Office Visit Note     Patient ID: Brandi Martinez is a 34 y.o. female.    Chief Complaint:  Chief Complaint   Patient presents with   • Right Knee - Pain, Edema, Initial Evaluation     HPI:    Ms. Brandi Martinez is a 34 y.o. female who presents to the clinic today for R knee pain. Started 3 months ago, long drives made it worse. Deep achy, insidious onset. No injury, no fall, no trauma. Slowly worsening over that time. Also began popping/clicking. Popping causes swelling. Becoming difficult now to even ambulate/bear weight. Points to her medial knee. Moderate swelling now as well. Taking naproxen PRN, ice with compression therapy, helps mildly.     Past Medical History:   Diagnosis Date   • Anemia    • Asthma        Past Surgical History:   Procedure Laterality Date   • APPENDECTOMY      [place]    •  SECTION     •  SECTION     •  SECTION     • HYSTERECTOMY      [facility]   • LAPAROSCOPIC CHOLECYSTECTOMY     • SALPINGECTOMY      [place]       Family History   Problem Relation Age of Onset   • Cancer Mother    • Heart disease Father      Social History     Occupational History   • Not on file   Tobacco Use   • Smoking status: Current Every Day Smoker   • Smokeless tobacco: Never Used   Substance and Sexual Activity   • Alcohol use: Yes     Comment: rare   • Drug use: No   • Sexual activity: Defer      Review of Systems   Constitutional: Negative for activity change and fever.   Respiratory: Negative for cough and shortness of breath.    Cardiovascular: Negative for chest pain.   Gastrointestinal: Negative for constipation, diarrhea, nausea and vomiting.   Musculoskeletal: Positive for arthralgias.   Skin: Negative for color change and rash.   Neurological: Negative for weakness.   Hematological: Does not bruise/bleed easily.     Objective:    /72 (BP Location: Left arm, Patient Position: Sitting)   Pulse 80   Temp 97.8 °F  "(36.6 °C)   Resp 16   Ht 177.8 cm (70\")   Wt 69.9 kg (154 lb)   SpO2 99%   BMI 22.10 kg/m²     Physical Examination:  Physical Exam   Constitutional: She appears well-developed and well-nourished. No distress.   HENT:   Head: Normocephalic and atraumatic.   Eyes: Conjunctivae are normal.   Cardiovascular: Intact distal pulses.   Pulmonary/Chest: Effort normal. No respiratory distress.   Musculoskeletal:        Right knee: She exhibits effusion (Trace).   Neurological: She is alert.   Skin: Skin is warm. Capillary refill takes less than 2 seconds. She is not diaphoretic.   Nursing note and vitals reviewed.    Right Knee Exam     Muscle Strength   The patient has normal right knee strength.    Tenderness   The patient is experiencing tenderness in the pes anserinus, medial hamstring and medial joint line.    Range of Motion   Extension: normal   Flexion: normal     Tests   Zaria:  Medial - positive Lateral - negative  Varus: negative Valgus: negative  Lachman:  Anterior - negative      Patellar apprehension: negative    Other   Erythema: absent  Sensation: normal  Pulse: present  Swelling: mild  Effusion: effusion (Trace) present          Imaging and other tests:  No new imaging today.    Assessment and Plan:    1. Acute pain of right knee  - MRI Knee Right Without Contrast; Future    2. Pain and swelling of knee, right  - MRI Knee Right Without Contrast; Future    I discussed with this patient today that I feel she has some mild lateral patellar tilt, and medial retinacular pain.  This could be due to patellofemoral maltracking, or other medial pathology.  She also has popping and clicking medially, with positive medial Zaria's test.  For that reason, I like to get an MRI to evaluate her medial meniscus.  Otherwise she was given a sample of topical Pennsaid.  RTC in 5 to 7 days for MRI results.    Khadar POSADAS \"Chance\" Alex NEWBERRY DO, CAQSM  06/29/20  09:21    Disclaimer: Please note that areas of this note " were completed with computer voice recognition software.  Quite often unanticipated grammatical, syntax, homophones, and other interpretive errors are inadvertently transcribed by the computer software. Please excuse any errors that have escaped final proofreading.

## 2020-07-07 ENCOUNTER — OFFICE VISIT (OUTPATIENT)
Dept: ORTHOPEDIC SURGERY | Facility: CLINIC | Age: 35
End: 2020-07-07

## 2020-07-07 VITALS
BODY MASS INDEX: 22.24 KG/M2 | WEIGHT: 155 LBS | SYSTOLIC BLOOD PRESSURE: 108 MMHG | HEART RATE: 75 BPM | DIASTOLIC BLOOD PRESSURE: 75 MMHG

## 2020-07-07 DIAGNOSIS — M95.8 OSTEOCHONDRAL DEFECT OF PATELLA: Primary | ICD-10-CM

## 2020-07-07 PROCEDURE — 99213 OFFICE O/P EST LOW 20 MIN: CPT | Performed by: FAMILY MEDICINE

## 2020-07-07 RX ORDER — TRAZODONE HYDROCHLORIDE 100 MG/1
TABLET ORAL
COMMUNITY
Start: 2020-07-02

## 2020-07-07 NOTE — PROGRESS NOTES
Primary Care Sports Medicine Office Visit Note     Patient ID: Brandi Martinez is a 34 y.o. female.    Chief Complaint:  Chief Complaint   Patient presents with   • Right Knee - Follow-up     MRI follow up     HPI:    Ms. Brandi Martinez is a 34 y.o. female who presents to the clinic today for MRI results.  Initially there was some concern after insidious onset pain for meniscal tear with complaint of popping or clicking.  We advanced MRI.  MRI was negative for meniscal or ligamentous pathology, see below.  We discussed this today.  She does have a chondral fissure in the inferior aspect of the medial patella.  Patient states pain has not improved or worsened over the past week, taking naproxen with little to no improvement.    Past Medical History:   Diagnosis Date   • Anemia    • Asthma        Past Surgical History:   Procedure Laterality Date   • APPENDECTOMY      [place]    •  SECTION     •  SECTION     •  SECTION     • HYSTERECTOMY      [facility]   • LAPAROSCOPIC CHOLECYSTECTOMY     • SALPINGECTOMY      [place]       Family History   Problem Relation Age of Onset   • Cancer Mother    • Heart disease Father      Social History     Occupational History   • Not on file   Tobacco Use   • Smoking status: Current Every Day Smoker   • Smokeless tobacco: Never Used   Substance and Sexual Activity   • Alcohol use: Yes     Comment: rare   • Drug use: No   • Sexual activity: Defer      Review of Systems   Constitutional: Negative for activity change and fever.   Musculoskeletal: Positive for arthralgias.   Skin: Negative for color change and rash.   Neurological: Negative for weakness.       Objective:    /75   Pulse 75   Wt 70.3 kg (155 lb)   BMI 22.24 kg/m²     Physical Examination:  Physical Exam   Constitutional: She appears well-developed and well-nourished. No distress.   HENT:   Head: Normocephalic and atraumatic.   Eyes: Conjunctivae are normal.  "  Cardiovascular: Intact distal pulses.   Pulmonary/Chest: Effort normal. No respiratory distress.   Musculoskeletal:        Right knee: She exhibits no effusion.   Neurological: She is alert.   Skin: Skin is warm. Capillary refill takes less than 2 seconds. She is not diaphoretic.   Nursing note and vitals reviewed.    Right Knee Exam     Tenderness   The patient is experiencing tenderness in the medial retinaculum.    Range of Motion   Extension: normal   Flexion: normal     Other   Erythema: absent  Sensation: normal  Pulse: present  Swelling: none  Effusion: no effusion present          Imaging and other tests:  MRI right knee dated 7/2/2020 reveals the following IMPRESSION:   Delaminating partial thickness fissures seen along the apex and medial facet of the patellar cartilage. Otherwise, no evidence of internal derangement or significant degenerative change.     Assessment and Plan:    1. Osteochondral defect of patella    I discussed with the patient today her MRI findings, pathology, and all treatment options.  She elected to proceed with corticosteroid and hyaluronic acid injection for cartilage fissuring of the patella.  We will work on prior authorization for HA, and she can return in 1 to 2 weeks for injection only visit.  I would like to follow that with physical therapy as well, will place that order at next visit.    Khadar POSADAS \"Chance\" Alex NEWBERRY DO, CAQSM  07/07/20  11:41    Disclaimer: Please note that areas of this note were completed with computer voice recognition software.  Quite often unanticipated grammatical, syntax, homophones, and other interpretive errors are inadvertently transcribed by the computer software. Please excuse any errors that have escaped final proofreading.  "

## 2020-07-08 ENCOUNTER — TELEPHONE (OUTPATIENT)
Dept: ORTHOPEDIC SURGERY | Facility: CLINIC | Age: 35
End: 2020-07-08

## 2020-07-08 NOTE — TELEPHONE ENCOUNTER
The plan you have selected ([facility]) does not cover any Hyaluronic Acid products, including Orthovisc and Monovisc.

## 2020-07-08 NOTE — TELEPHONE ENCOUNTER
None?? Enmanuel.     Ok, please let this patient know, and tell her that if she would like to return for corticosteroid injection, she can. That is the next best option for her.

## 2020-07-13 NOTE — TELEPHONE ENCOUNTER
Fax from [employer] Benefit [employer] Program    Stating Monovisc is covered with an approved PA. The plan type is PPO. There is a copay for the OV. If collected at the time of service, the copay will be $20.00.  The ded has been met and the patietn's responsibility is 20% of the allowable amt. Once the OOP is met, the pt will have no financial responsibility. Coverage is based on the provider ensuring the pt meets medical necessity for this therapy. Call ref # [id number]

## 2020-07-14 NOTE — TELEPHONE ENCOUNTER
Call placed to [facility] s/w Rikki Muñoz is not avail on this pt's plan for Buy and bill. Will need to contact [facility] to obtain medication for pt through pharmacy benefit.    Call placed to [facility], s/w Gayathri, to give script. Will contact us to ship out once ready.

## 2020-07-16 ENCOUNTER — OFFICE VISIT (OUTPATIENT)
Dept: ORTHOPEDIC SURGERY | Facility: CLINIC | Age: 35
End: 2020-07-16

## 2020-07-16 VITALS
HEART RATE: 78 BPM | DIASTOLIC BLOOD PRESSURE: 61 MMHG | SYSTOLIC BLOOD PRESSURE: 92 MMHG | BODY MASS INDEX: 22.53 KG/M2 | WEIGHT: 157 LBS

## 2020-07-16 DIAGNOSIS — M22.41 CHONDROMALACIA, PATELLA, RIGHT: Primary | ICD-10-CM

## 2020-07-16 PROCEDURE — 20610 DRAIN/INJ JOINT/BURSA W/O US: CPT | Performed by: FAMILY MEDICINE

## 2020-07-16 PROCEDURE — 99213 OFFICE O/P EST LOW 20 MIN: CPT | Performed by: FAMILY MEDICINE

## 2020-07-16 RX ORDER — TRIAMCINOLONE ACETONIDE 40 MG/ML
80 INJECTION, SUSPENSION INTRA-ARTICULAR; INTRAMUSCULAR
Status: COMPLETED | OUTPATIENT
Start: 2020-07-16 | End: 2020-07-16

## 2020-07-16 RX ADMIN — TRIAMCINOLONE ACETONIDE 80 MG: 40 INJECTION, SUSPENSION INTRA-ARTICULAR; INTRAMUSCULAR at 11:38

## 2020-07-16 NOTE — PROGRESS NOTES
Procedure   Large Joint Arthrocentesis: R knee  Date/Time: 7/16/2020 11:38 AM  Consent given by: patient  Site marked: site marked  Timeout: Immediately prior to procedure a time out was called to verify the correct patient, procedure, equipment, support staff and site/side marked as required   Supporting Documentation  Indications: pain   Procedure Details  Location: knee - R knee  Preparation: Patient was prepped and draped in the usual sterile fashion  Needle size: 25 G  Approach: anteromedial  Medications administered: 80 mg triamcinolone acetonide 40 MG/ML (2cc of 1% lidocaine without epinepherine, and 2cc of 40mg Kenalog)  Patient tolerance: patient tolerated the procedure well with no immediate complications (Blood loss negligable, pt admits to immediate decrease in pain and improved ROM with gentle ambulation post injection.)

## 2020-07-16 NOTE — PROGRESS NOTES
Primary Care Sports Medicine Office Visit Note     Patient ID: Brandi Martinez is a 34 y.o. female.    Chief Complaint:  Chief Complaint   Patient presents with   • Right Knee - Follow-up, Pain     Increased pain and edema, pain is now radiating down to mid shin     HPI:    Ms. Brandi Martinez is a 34 y.o. female who presents to the clinic today for worsening of knee pain.  Patient states that over the last few days medial aching knee pain is gotten significantly worse.  She is previously been seen and evaluated, MRI shows medial chondral fissure of patellar cartilage.  We currently are waiting on prior authorization for hyaluronic acid, patient has never had injection in this knee.    Past Medical History:   Diagnosis Date   • Anemia    • Asthma        Past Surgical History:   Procedure Laterality Date   • APPENDECTOMY      [place]    •  SECTION     •  SECTION     •  SECTION     • HYSTERECTOMY      [facility]   • LAPAROSCOPIC CHOLECYSTECTOMY     • SALPINGECTOMY      [place]       Family History   Problem Relation Age of Onset   • Cancer Mother    • Heart disease Father      Social History     Occupational History   • Not on file   Tobacco Use   • Smoking status: Current Every Day Smoker   • Smokeless tobacco: Never Used   Substance and Sexual Activity   • Alcohol use: Yes     Comment: rare   • Drug use: No   • Sexual activity: Defer      Review of Systems   Constitutional: Negative for activity change and fever.   Musculoskeletal: Positive for arthralgias.   Skin: Negative for color change and rash.   Neurological: Negative for weakness.       Objective:    BP 92/61   Pulse 78   Wt 71.2 kg (157 lb)   BMI 22.53 kg/m²     Physical Examination:  Physical Exam   Constitutional: She appears well-developed and well-nourished. No distress.   HENT:   Head: Normocephalic and atraumatic.   Eyes: Conjunctivae are normal.   Cardiovascular: Intact distal pulses.  "  Pulmonary/Chest: Effort normal. No respiratory distress.   Musculoskeletal:        Right knee: She exhibits no effusion.   Neurological: She is alert.   Skin: Skin is warm. Capillary refill takes less than 2 seconds. She is not diaphoretic.   Nursing note and vitals reviewed.    Right Ankle Exam     Range of Motion   The patient has normal right ankle ROM.      Right Knee Exam     Muscle Strength   The patient has normal right knee strength.    Tenderness   The patient is experiencing tenderness in the medial retinaculum.    Range of Motion   The patient has normal right knee ROM.  Extension: normal   Flexion: normal     Tests   Zaria:  Medial - negative Lateral - negative  Varus: negative Valgus: negative  Lachman:  Anterior - negative      Drawer:  Anterior - negative    Posterior - negative    Other   Erythema: absent  Sensation: normal (Distal to the knee. DP and PT palpable. )  Pulse: present  Swelling: none  Effusion: no effusion present    Comments:  Positive patellar grind      Right Hip Exam     Range of Motion   The patient has normal right hip ROM.          Imaging and other tests:  No new imaging today.    Assessment and Plan:    1. Chondromalacia, patella, right    After discussion of risks and benefits, the patient elected to proceed with corticosteroid injection to the right knee.  The patient tolerated this procedure well without any complaints or problems.  I recommended continuation of conservative management as previous, hopefully PA for hyaluronic acid will be cleared soon.  RTC at that time for HA injection.    Khadar POSADAS \"Chance\" Alex NEWBERRY DO, CAQSM  07/16/20  11:37    Disclaimer: Please note that areas of this note were completed with computer voice recognition software.  Quite often unanticipated grammatical, syntax, homophones, and other interpretive errors are inadvertently transcribed by the computer software. Please excuse any errors that have escaped final proofreading.  "

## 2020-07-16 NOTE — TELEPHONE ENCOUNTER
PA request from [facility] for Monovisc.    Approved today  Case Id:[id number];  Status:Approved;  Review Type:Prior Auth;  Coverage Start Date:06/16/2020;  Coverage End Date:08/15/2020

## 2020-07-17 NOTE — TELEPHONE ENCOUNTER
Call placed to patient to advise Monovisc had been approved through her speciality pharmacy and to see if they had reached out to her yet. States she believed she missed a call from them yesterday and will call them back now. Advised to let me know if there are any issues but to be sure that they mail the Monovisc to our office address. Patient expressed understanding.

## 2020-07-22 ENCOUNTER — TELEPHONE (OUTPATIENT)
Dept: ORTHOPEDIC SURGERY | Facility: CLINIC | Age: 35
End: 2020-07-22

## 2020-07-22 NOTE — TELEPHONE ENCOUNTER
Monovisc received in office from [facility] pharmacy, [facility].    Call placed to patient to schedule for her Monovisc injection; advised that Dr. Johnson will not be back in until next week. Appt given for next Wednesday 7/29/2020.

## 2020-07-24 ENCOUNTER — HOSPITAL ENCOUNTER (EMERGENCY)
Facility: HOSPITAL | Age: 35
Discharge: HOME | End: 2020-07-24
Attending: EMERGENCY MEDICINE | Admitting: EMERGENCY MEDICINE

## 2020-07-24 VITALS
BODY MASS INDEX: 22.72 KG/M2 | HEART RATE: 80 BPM | HEIGHT: 70 IN | TEMPERATURE: 98.2 F | DIASTOLIC BLOOD PRESSURE: 68 MMHG | OXYGEN SATURATION: 99 % | SYSTOLIC BLOOD PRESSURE: 114 MMHG | RESPIRATION RATE: 16 BRPM | WEIGHT: 158.73 LBS

## 2020-07-24 DIAGNOSIS — M22.41 CHONDROMALACIA OF RIGHT PATELLA: Primary | ICD-10-CM

## 2020-07-24 DIAGNOSIS — R20.2 PARESTHESIA OF RIGHT LEG: ICD-10-CM

## 2020-07-24 DIAGNOSIS — M21.371 RIGHT FOOT DROP: ICD-10-CM

## 2020-07-24 PROCEDURE — 99283 EMERGENCY DEPT VISIT LOW MDM: CPT

## 2020-07-25 NOTE — DISCHARGE INSTRUCTIONS
Wear the cam walker splint on your right lower leg.  Minimize the use of your knee splint for the next 5 days.  Follow-up with your orthopedist, Dr. Johnson, on Wednesday

## 2020-07-25 NOTE — ED NOTES
Dr. Johnson her ortho doctor told her to come to the [facility] MRI done one month ago diagnosed a fracture in her knee.  Patient states she came in due to having no sensation from the middle of her right chin down.  The patient has no sensation in her foot and has trouble moving her foot back and can only minimally wiggle her toes.  No swelling is present, no pain in calf and distal pulse is present.  Will continue to monitor.     Ute Ball RN  07/24/20 [id number]

## 2020-07-25 NOTE — ED PROVIDER NOTES
Subjective   History of Present Illness  34-year-old female's been having right knee pain over the past 2 to 3 months.  She was seen by orthopedics about a month ago and had an MRI that showed chondromalacia patella.  She had an injection of steroids this past week and she is scheduled to have an injection of hyaluronic acid in the future.  The patient has been wearing a brace around the right knee but now she complains of numbness and foot drop in the right lower extremity over the past 2 days.  Her doctor advised her to come to the emergency department tonight.  He did mention that she may have a DVT.  Patient denies any shortness of breath and she denies any history of any trauma.  Review of Systems  No fever chills trauma  Past Medical History:   Diagnosis Date   • Anemia    • Asthma        Allergies   Allergen Reactions   • Morphine Itching, Swelling and Unknown (See Comments)   • Ondansetron Hcl Other (See Comments)   • Ketorolac Other (See Comments)   • Topiramate Unknown (See Comments)     Kidney stones   • Butorphanol Anxiety and Other (See Comments)       Past Surgical History:   Procedure Laterality Date   • APPENDECTOMY      [place]    •  SECTION     •  SECTION     •  SECTION     • HYSTERECTOMY      [facility]   • LAPAROSCOPIC CHOLECYSTECTOMY     • SALPINGECTOMY      [place]       Family History   Problem Relation Age of Onset   • Cancer Mother    • Heart disease Father        Social History     Socioeconomic History   • Marital status:      Spouse name: Not on file   • Number of children: Not on file   • Years of education: Not on file   • Highest education level: Not on file   Tobacco Use   • Smoking status: Current Every Day Smoker   • Smokeless tobacco: Never Used   Substance and Sexual Activity   • Alcohol use: Yes     Comment: rare   • Drug use: No   • Sexual activity: Defer           Objective   Physical Exam  The patient is awake and  alert she is afebrile vital signs are stable the right lower extremity shows the intentions from the brace that she has been wearing around the knee.  The patient has some mild tenderness over the medial aspect of the knee.  There is no palpable effusion.  She has a negative Lockman's and there is no laxity with valgus or varus stress.  No crepitance was noted.  She is able to flex and extend at the knee without any difficulty.  I do not see any evidence of any swelling or skin discoloration or venous distention involving the leg below the knee.  The patient has some decreased sensation in the pretibial area and she also has some weakness with dorsiflexion.  Her reflexes however are 2+ and symmetrical.  She has good distal pulses and no evidence of a DVT.  Procedures           ED Course      I looked at the results of her MRI that was done 3 weeks ago and the patient has chondromalacia patella.                                     MDM  The patient has chondromalacia patella and she also has some findings of nerve involvement in the lower extremity with numbness pretibially and mild foot drop.  I cannot see anything other than pressure from the splint that could be causing this.  There is no evidence of a DVT.  The patient was offered to have a splint placed and then do an ultrasound of her leg tomorrow morning since I am unable to do 1 tonight but the patient stated that she had an appointment to see her doctor in 4 days and will talk to him about an ultrasound if needed at that point.  Patient was placed in a short cam walker.  Final diagnoses:   Chondromalacia of right patella   Right foot drop   Paresthesia of right leg            Michael Haas MD  07/24/20 9611

## 2020-07-29 ENCOUNTER — TELEPHONE (OUTPATIENT)
Dept: ORTHOPEDIC SURGERY | Facility: CLINIC | Age: 35
End: 2020-07-29

## 2020-07-29 ENCOUNTER — OFFICE VISIT (OUTPATIENT)
Dept: ORTHOPEDIC SURGERY | Facility: CLINIC | Age: 35
End: 2020-07-29

## 2020-07-29 VITALS
HEIGHT: 70 IN | WEIGHT: 158 LBS | BODY MASS INDEX: 22.62 KG/M2 | HEART RATE: 76 BPM | SYSTOLIC BLOOD PRESSURE: 93 MMHG | DIASTOLIC BLOOD PRESSURE: 63 MMHG

## 2020-07-29 DIAGNOSIS — M17.11 PRIMARY OSTEOARTHRITIS OF RIGHT KNEE: ICD-10-CM

## 2020-07-29 DIAGNOSIS — M22.41 CHONDROMALACIA OF PATELLA, RIGHT: ICD-10-CM

## 2020-07-29 DIAGNOSIS — G57.41 TIBIAL NEUROPATHY, RIGHT: Primary | ICD-10-CM

## 2020-07-29 PROCEDURE — 99214 OFFICE O/P EST MOD 30 MIN: CPT | Performed by: FAMILY MEDICINE

## 2020-07-29 PROCEDURE — 20610 DRAIN/INJ JOINT/BURSA W/O US: CPT | Performed by: FAMILY MEDICINE

## 2020-07-29 NOTE — PROGRESS NOTES
Procedure   Large Joint Arthrocentesis  Date/Time: 7/29/2020 4:09 PM  Consent given by: patient  Timeout: Immediately prior to procedure a time out was called to verify the correct patient, procedure, equipment, support staff and site/side marked as required   Supporting Documentation  Indications: pain   Procedure Details  Location: knee -   Preparation: Patient was prepped and draped in the usual sterile fashion  Needle size: 22 G  Approach: anteromedial  Medications administered: 88 mg Hyaluronan 88 MG/4ML  Patient tolerance: patient tolerated the procedure well with no immediate complications (Blood loss negligable, pt admits to immediate decrease in pain and improved ROM with gentle ambulation post injection.)

## 2020-07-29 NOTE — TELEPHONE ENCOUNTER
"I received a text from the after hours phone service on the date of 7/24/2020 and returned a call to the patient.  She states that over the past 3-5 days she started to notice some tingling, numbness, and now weakness in her right lower extremity distal to the new brace she has been wearing.  Tingling and numbness was not of concern to her so she continue wearing her brace, but unfortunately now she admits to a moderate amount of swelling, pain to the popliteal fossa, and foot drop.  She states weakness has become profound where she cannot plantarflex the foot whatsoever.  After discussion of swelling, there is concern for DVT.  I recommend this patient go to the emergency department for further evaluation, she verbalized understanding and stated she would go straight to the ER for rule out DVT ultrasound.  Otherwise, consider loosening and/or possible even discontinuing brace to prevent further neuropathy.  RTC as scheduled.    Khadar POSADAS \"Gume\" Alex NEWBERRY DO, CAQSM  07/29/20  15:22      "

## 2020-07-29 NOTE — PROGRESS NOTES
Primary Care Sports Medicine Office Visit Note     Patient ID: Brandi Martinez is a 34 y.o. female.    Chief Complaint:  Chief Complaint   Patient presents with   • Right Knee - Follow-up, Pain     Monovisc inj     HPI:    Ms. Brandi Martinez is a 34 y.o. female who returns to the clinic today for monovisc injection. She states after discussing problems with brace over the phone on Friday afternoon, she went to the emergency department.  Notes reviewed.  Unfortunately ultrasound was not available so DVT was never completely ruled out.  However the patient states after loosening the brace, swelling, tingling, and pain have significantly decreased.  She is starting to have return in strength of dorsiflexion activity of the foot, but it is still mildly weak.  She would like to move forward with Monovisc injection.    Past Medical History:   Diagnosis Date   • Anemia    • Asthma        Past Surgical History:   Procedure Laterality Date   • APPENDECTOMY      [place]    •  SECTION     •  SECTION     •  SECTION     • HYSTERECTOMY      [facility]   • LAPAROSCOPIC CHOLECYSTECTOMY     • SALPINGECTOMY      [place]       Family History   Problem Relation Age of Onset   • Cancer Mother    • Heart disease Father      Social History     Occupational History   • Not on file   Tobacco Use   • Smoking status: Current Every Day Smoker     Packs/day: 0.40     Types: Cigarettes   • Smokeless tobacco: Never Used   Substance and Sexual Activity   • Alcohol use: Yes     Frequency: Monthly or less     Drinks per session: 1 or 2     Binge frequency: Never     Comment: rare   • Drug use: No   • Sexual activity: Defer      Review of Systems   Constitutional: Negative for activity change and fever.   Musculoskeletal: Positive for arthralgias.   Skin: Negative for color change and rash.   Neurological: Positive for weakness and numbness.     Objective:    BP 93/63 (BP Location: Left arm,  "Patient Position: Sitting, Cuff Size: Large Adult)   Pulse 76   Ht 177.8 cm (70\")   Wt 71.7 kg (158 lb)   BMI 22.67 kg/m²     Physical Examination:  Physical Exam   Constitutional: She appears well-developed and well-nourished. No distress.   HENT:   Head: Normocephalic and atraumatic.   Eyes: Conjunctivae are normal.   Cardiovascular: Intact distal pulses.   Pulmonary/Chest: Effort normal. No respiratory distress.   Musculoskeletal:        Right knee: She exhibits no effusion.   Neurological: She is alert.   Skin: Skin is warm. Capillary refill takes less than 2 seconds. She is not diaphoretic.   Nursing note and vitals reviewed.    Right Knee Exam     Muscle Strength   The patient has normal right knee strength.    Tenderness   The patient is experiencing no tenderness.     Range of Motion   Extension: normal   Flexion: normal     Tests   Zaria:  Medial - negative Lateral - negative  Varus: negative Valgus: negative  Right knee patellar apprehension test: +patellar grind, +huber wayne.    Other   Erythema: absent  Sensation: decreased  Pulse: present (distal to the knee, DP and PT palpable)  Swelling: none  Effusion: no effusion present    Comments:  +patellar grind testing, decreased sensation in the tibial and common peroneal - cutaneous distributions          Imaging and other tests:  No new imaging today.     Assessment and Plan:    1. Tibial neuropathy, right    2. Primary osteoarthritis of right knee    3. Chondromalacia of patella, right    I discussed the patient today that I think she is having a peripheral neuropathy due to tightness of the knee brace in the popliteal fossa.  She had mild foot drop over the weekend, but strength is slowly returning today after loosening the brace.  Today I recommend she discontinue the brace altogether.  She was given Monovisc injection, which she tolerated well without any complaints or problems.  Call in 1 week to reevaluate this problem over the phone, as " "long as muscle strength continues to return nothing further to do besides discontinuing brace.  Otherwise, RTC in 2-3 months to reevaluate patellar chondromalacia.    Khadar POSADAS \"Chance\" Alex NEWBERRY DO, CAQSM  07/29/20  16:07    Disclaimer: Please note that areas of this note were completed with computer voice recognition software.  Quite often unanticipated grammatical, syntax, homophones, and other interpretive errors are inadvertently transcribed by the computer software. Please excuse any errors that have escaped final proofreading.  "

## 2020-08-07 ENCOUNTER — TELEPHONE (OUTPATIENT)
Dept: ORTHOPEDIC SURGERY | Facility: CLINIC | Age: 35
End: 2020-08-07

## 2020-08-07 RX ORDER — METHYLPREDNISOLONE 4 MG/1
TABLET ORAL
Qty: 21 TABLET | Refills: 0 | Status: SHIPPED | OUTPATIENT
Start: 2020-08-07 | End: 2020-09-28

## 2020-08-07 NOTE — TELEPHONE ENCOUNTER
PT called stating she was supposed to call to let you know how she was doing.She stated symptoms are worse. She is having increased popping in her right knee and increased numbness in her right foot and states she has no sensation in her right foot and it is causing her to trip while walking.   Offered PT apt for today but she declined until she hears back from office.

## 2020-08-26 ENCOUNTER — HOSPITAL ENCOUNTER (OUTPATIENT)
Dept: GENERAL RADIOLOGY | Facility: HOSPITAL | Age: 35
Discharge: HOME | End: 2020-08-26
Admitting: INTERNAL MEDICINE

## 2020-08-26 ENCOUNTER — TRANSCRIBE ORDERS (OUTPATIENT)
Dept: ADMINISTRATIVE | Facility: HOSPITAL | Age: 35
End: 2020-08-26

## 2020-08-26 ENCOUNTER — LAB (OUTPATIENT)
Dept: LAB | Facility: HOSPITAL | Age: 35
End: 2020-08-26

## 2020-08-26 DIAGNOSIS — R07.9 CHEST PAIN, UNSPECIFIED TYPE: ICD-10-CM

## 2020-08-26 DIAGNOSIS — R06.02 SHORTNESS OF BREATH: Primary | ICD-10-CM

## 2020-08-26 DIAGNOSIS — Z20.828 EXPOSURE TO SARS-ASSOCIATED CORONAVIRUS: ICD-10-CM

## 2020-08-26 DIAGNOSIS — R06.02 SHORTNESS OF BREATH: ICD-10-CM

## 2020-08-26 PROCEDURE — C9803 HOPD COVID-19 SPEC COLLECT: HCPCS

## 2020-08-26 PROCEDURE — 36415 COLL VENOUS BLD VENIPUNCTURE: CPT

## 2020-08-26 PROCEDURE — 86769 SARS-COV-2 COVID-19 ANTIBODY: CPT

## 2020-08-26 PROCEDURE — U0002 COVID-19 LAB TEST NON-CDC: HCPCS

## 2020-08-26 PROCEDURE — U0004 COV-19 TEST NON-CDC HGH THRU: HCPCS

## 2020-08-26 PROCEDURE — 71046 X-RAY EXAM CHEST 2 VIEWS: CPT

## 2020-08-27 LAB
REF LAB TEST METHOD: NORMAL
SARS-COV-2 RNA RESP QL NAA+PROBE: NOT DETECTED

## 2020-08-28 LAB — SARS-COV-2 AB SERPL QL IA: NEGATIVE

## 2020-09-28 ENCOUNTER — OFFICE VISIT (OUTPATIENT)
Dept: ORTHOPEDIC SURGERY | Facility: CLINIC | Age: 35
End: 2020-09-28

## 2020-09-28 VITALS
BODY MASS INDEX: 23.85 KG/M2 | DIASTOLIC BLOOD PRESSURE: 76 MMHG | HEIGHT: 70 IN | HEART RATE: 89 BPM | WEIGHT: 166.6 LBS | SYSTOLIC BLOOD PRESSURE: 113 MMHG

## 2020-09-28 DIAGNOSIS — M21.371 RIGHT FOOT DROP: Primary | ICD-10-CM

## 2020-09-28 PROCEDURE — 99214 OFFICE O/P EST MOD 30 MIN: CPT | Performed by: FAMILY MEDICINE

## 2020-09-28 NOTE — PROGRESS NOTES
Primary Care Sports Medicine Office Visit Note     Patient ID: Brandi Martinez is a 34 y.o. female.    Chief Complaint:  Chief Complaint   Patient presents with   • Right Knee - Follow-up   • Right Foot - Follow-up     HPI:    Ms. Brandi Martinez is a 34 y.o. female who returns to the clinic today for follow-up of right knee pain, and ankle/foot weakness.  The patient was seen and evaluated on 2020, and unfortunately was lost to follow-up over the past 8 weeks due to an insurance change.  Over that time, she states the strength of dorsiflexion of her foot has gotten much worse.  She is very weak, and has been walking with a foot drop for over 4 weeks now.  Previously being seen, we discussed this likely being due to nervous compression with brace, and we discontinued the brace, start Medrol Dosepak.  We thus discussed this her strength had started to improve, however she states after Monovisc injection for chondromalacia patella, she noticed her strength again diminished, and has not done anything about it for about 4-6 weeks.  Past Medical History:   Diagnosis Date   • Anemia    • Asthma        Past Surgical History:   Procedure Laterality Date   • APPENDECTOMY      [place]    •  SECTION  2004   •  SECTION     •  SECTION     • HYSTERECTOMY      [facility]   • LAPAROSCOPIC CHOLECYSTECTOMY     • SALPINGECTOMY      [place]       Family History   Problem Relation Age of Onset   • Cancer Mother    • Heart disease Father      Social History     Occupational History   • Not on file   Tobacco Use   • Smoking status: Current Every Day Smoker     Packs/day: 0.40     Types: Cigarettes   • Smokeless tobacco: Never Used   Substance and Sexual Activity   • Alcohol use: Yes     Frequency: Monthly or less     Drinks per session: 1 or 2     Binge frequency: Never     Comment: rare   • Drug use: No   • Sexual activity: Defer      Review of Systems   Constitutional: Negative for  "activity change and fever.   Musculoskeletal: Positive for arthralgias.   Skin: Negative for color change and rash.   Neurological: Positive for weakness and numbness.       Objective:    /76 (BP Location: Left arm, Patient Position: Sitting, Cuff Size: Adult)   Pulse 89   Ht 177.8 cm (70\")   Wt 75.6 kg (166 lb 9.6 oz)   BMI 23.90 kg/m²     Physical Examination:  Physical Exam  Vitals signs and nursing note reviewed.   Constitutional:       General: She is not in acute distress.     Appearance: She is well-developed. She is not diaphoretic.   HENT:      Head: Normocephalic and atraumatic.   Eyes:      Conjunctiva/sclera: Conjunctivae normal.   Pulmonary:      Effort: Pulmonary effort is normal. No respiratory distress.   Musculoskeletal:      Right knee: She exhibits no effusion.   Skin:     General: Skin is warm.      Capillary Refill: Capillary refill takes less than 2 seconds.   Neurological:      Mental Status: She is alert.       Right Ankle Exam     Range of Motion   The patient has normal right ankle ROM.      Right Knee Exam     Muscle Strength   The patient has normal right knee strength.    Tenderness   The patient is experiencing no tenderness.     Range of Motion   The patient has normal right knee ROM.  Extension: normal   Flexion: normal     Tests   Zaria:  Medial - negative Lateral - negative  Varus: negative Valgus: negative  Lachman:  Anterior - negative      Drawer:  Anterior - negative    Posterior - negative    Other   Erythema: absent  Sensation: normal (Distal to the knee. DP and PT palpable. )  Pulse: present  Swelling: none  Effusion: no effusion present    Comments:  Positive tells at the fibular head      Right Hip Exam     Range of Motion   The patient has normal right hip ROM.      Back Exam     Tenderness   The patient is experiencing no tenderness.     Range of Motion   Extension: normal   Flexion: normal   Lateral bend right: normal   Lateral bend left: normal   Rotation " "right: normal   Rotation left: normal     Muscle Strength   Back normal muscle strength: Ankle dorsiflexion 2/5 on the right.  Right Quadriceps:  5/5   Left Quadriceps:  5/5   Right Hamstrings:  5/5   Left Hamstrings:  5/5     Reflexes   Patellar: normal    Other   Gait: drop-foot   Erythema: no back redness        Imaging and other tests:  No new imaging today.    Assessment and Plan:    1. Right foot drop  - MRI Lumbar Spine Without Contrast; Future    It is concerning that this patient has had this issue for over 2 months now.  She likely will require immediate decompression should we find nervous compression on MRI of her lumbar spine.  RTC ASAP to discuss treatment options status post MRI.    Khadar POSADAS \"Chance\" Alex NEWBERRY DO, CAQSM  09/28/20  14:10 EDT    Disclaimer: Please note that areas of this note were completed with computer voice recognition software.  Quite often unanticipated grammatical, syntax, homophones, and other interpretive errors are inadvertently transcribed by the computer software. Please excuse any errors that have escaped final proofreading.  "

## 2020-10-04 ENCOUNTER — HOSPITAL ENCOUNTER (OUTPATIENT)
Dept: MRI IMAGING | Facility: HOSPITAL | Age: 35
Discharge: HOME | End: 2020-10-04
Admitting: FAMILY MEDICINE

## 2020-10-04 DIAGNOSIS — M21.371 RIGHT FOOT DROP: ICD-10-CM

## 2020-10-04 PROCEDURE — 72148 MRI LUMBAR SPINE W/O DYE: CPT

## 2020-10-06 ENCOUNTER — OFFICE VISIT (OUTPATIENT)
Dept: ORTHOPEDIC SURGERY | Facility: CLINIC | Age: 35
End: 2020-10-06

## 2020-10-06 VITALS
WEIGHT: 167 LBS | HEART RATE: 84 BPM | HEIGHT: 70 IN | BODY MASS INDEX: 23.91 KG/M2 | SYSTOLIC BLOOD PRESSURE: 110 MMHG | DIASTOLIC BLOOD PRESSURE: 76 MMHG

## 2020-10-06 DIAGNOSIS — M21.371 FOOT DROP, RIGHT: Primary | ICD-10-CM

## 2020-10-06 PROCEDURE — 99214 OFFICE O/P EST MOD 30 MIN: CPT | Performed by: FAMILY MEDICINE

## 2020-10-06 NOTE — PROGRESS NOTES
Primary Care Sports Medicine Office Visit Note     Patient ID: Brandi Martinez is a 34 y.o. female.    Chief Complaint:  Chief Complaint   Patient presents with   • Lumbar Spine - Follow-up   • Right Foot - Follow-up     HPI:    Ms. Brandi Martinez is a 34 y.o. female who presents to the clinic today for follow-up evaluation and MRI results for foot drop.  The patient a few days ago had MRI, please see MRI results below.  She states in the last few days, she does not have any significant changes.  After wearing a compressive knee brace, due to knee injury, she seems to have had a nervous compression at the lateral knee, that caused some decrease in sensation.  Slowly this worsened, and having back pain as well, she started to have a foot drop.  She was sent for MRI at that time.  Today she denies any overt or significant back or hip pain, continues to have weakness of great toe extension.    Past Medical History:   Diagnosis Date   • Anemia    • Asthma        Past Surgical History:   Procedure Laterality Date   • APPENDECTOMY      [place]    •  SECTION     •  SECTION     •  SECTION     • HYSTERECTOMY      [facility]   • LAPAROSCOPIC CHOLECYSTECTOMY     • SALPINGECTOMY      [place]       Family History   Problem Relation Age of Onset   • Cancer Mother    • Heart disease Father      Social History     Occupational History   • Not on file   Tobacco Use   • Smoking status: Current Every Day Smoker     Packs/day: 0.40     Types: Cigarettes   • Smokeless tobacco: Never Used   Substance and Sexual Activity   • Alcohol use: Yes     Frequency: Monthly or less     Drinks per session: 1 or 2     Binge frequency: Never     Comment: rare   • Drug use: No   • Sexual activity: Defer      Review of Systems   Constitutional: Negative for activity change and fever.   Musculoskeletal: Positive for arthralgias.   Skin: Negative for color change and rash.   Neurological: Positive for  "weakness.       Objective:    /76 (BP Location: Right arm, Patient Position: Sitting, Cuff Size: Adult)   Pulse 84   Ht 177.8 cm (70\")   Wt 75.8 kg (167 lb)   BMI 23.96 kg/m²     Physical Examination:  Physical Exam  Vitals signs and nursing note reviewed.   Constitutional:       General: She is not in acute distress.     Appearance: She is well-developed. She is not diaphoretic.   HENT:      Head: Normocephalic and atraumatic.   Eyes:      Conjunctiva/sclera: Conjunctivae normal.   Pulmonary:      Effort: Pulmonary effort is normal. No respiratory distress.   Skin:     General: Skin is warm.      Capillary Refill: Capillary refill takes less than 2 seconds.   Neurological:      Mental Status: She is alert.       Right Ankle Exam     Tenderness   The patient is experiencing no tenderness.  Swelling: none    Range of Motion   Dorsiflexion: normal   Plantar flexion: normal   Eversion: normal   Inversion: normal     Muscle Strength   Dorsiflexion:  3/5  Anterior tibial:  2/5  Posterior tibial:  5/5  Gastrocsoleus:  5/5  Peroneal muscle:  5/5    Other   Erythema: absent  Sensation: decreased  Pulse: present         Imaging and other tests:  MRI Lumbar spine dated 10/4/2020 yields the following results IMPRESSION:  1.  Multilevel degenerative disc disease and degenerative facet changes  detailed above.  There is no significant canal stenosis.  No definite  nerve root compression.  There is mild multilevel neural foraminal  narrowing as detailed above.    Assessment and Plan:    1. Foot drop, right  - EMG 1 Limb; Future    I discussed this patient multiple potential pathologies, but feel that most of the lumbar issues that we are concerned about have been effectively ruled out today with MRI lumbar spine.  I would like to get an EMG/nerve conduction study for further evaluation of nerve pathology/injury.  Will call patient with results.  Follow-up with neurology if warranted.    Khadar POSADAS \"Chance\" Alex NEWBERRY, DO, " CAQSM  10/06/20  10:26 EDT    Disclaimer: Please note that areas of this note were completed with computer voice recognition software.  Quite often unanticipated grammatical, syntax, homophones, and other interpretive errors are inadvertently transcribed by the computer software. Please excuse any errors that have escaped final proofreading.

## 2020-10-21 ENCOUNTER — PROCEDURE VISIT (OUTPATIENT)
Dept: NEUROLOGY | Facility: CLINIC | Age: 35
End: 2020-10-21

## 2020-10-21 VITALS — TEMPERATURE: 98 F

## 2020-10-21 DIAGNOSIS — M21.371 FOOT DROP, RIGHT: Primary | ICD-10-CM

## 2020-10-21 PROCEDURE — 95886 MUSC TEST DONE W/N TEST COMP: CPT | Performed by: PSYCHIATRY & NEUROLOGY

## 2020-10-21 PROCEDURE — 95908 NRV CNDJ TST 3-4 STUDIES: CPT | Performed by: PSYCHIATRY & NEUROLOGY

## 2020-10-21 RX ORDER — DIVALPROEX SODIUM 500 MG/1
500 TABLET, DELAYED RELEASE ORAL 2 TIMES DAILY
COMMUNITY
Start: 2020-10-06

## 2020-10-21 NOTE — PROGRESS NOTES
EMG and Nerve Conduction Studies    The complete report includes the data sheets.     Referring Doctor: Khadar Johnson II, DO    History: BLE/ Right Foot Drop    Results:    1.  The right peroneal motor nerve conduction study was normal with normal distal latency and normal conduction velocity below and across the fibular head.  The amplitude of the compound muscle action potential was essentially identical at the 3 stimulation sites.    2.  The right sural and right tibial motor nerve conduction studies were also normal.  The right tibial and right peroneal F-wave latencies were normal    3.  There were acute neurogenic changes in the tibialis anterior peroneus longus and extensor digitorum brevis muscle vastus lateralis and gastrocnemius muscles were normal the lumbar paraspinal muscles showed no spontaneous potentials.      Impression:    This is an abnormal study with acute neurogenic changes with fibrillations and positive sharp waves in the muscles of the peroneal nerve distribution.  The peroneal nerve motor conduction study however demonstrates normal conduction across the fibular head segment of the nerve.  These findings may be due to an L5 radiculopathy though no spontaneous potentials were seen in the paraspinal muscles.  Therefore correlation with imaging studies of the lumbar spine is indicated.  If the symptoms persist or worsen a repeat study in several months for further evaluation may be indicated.      Joseph Seipel, M.D.

## 2020-10-22 ENCOUNTER — TELEPHONE (OUTPATIENT)
Dept: PODIATRY | Facility: CLINIC | Age: 35
End: 2020-10-22

## 2020-10-30 ENCOUNTER — TELEPHONE (OUTPATIENT)
Dept: NEUROLOGY | Facility: CLINIC | Age: 35
End: 2020-10-30

## 2020-10-30 NOTE — TELEPHONE ENCOUNTER
FAXED TO NUMBER GIVEN BY [facility]   
PT CALLED STATING SHE NEEDS EMG RESULTS FROM 10/21/20 FAXED TO HER ORTHO DOCTOR IN [facility] AS SHE IS THERE NOW. SPOKE WITH [facility] WHO WILL FAX RESULTS    FAX- [phone] ATTN:MARKELL  
Render Post-Care Instructions In Note?: no
Consent: The patient's consent was obtained including but not limited to risks of crusting, scabbing, blistering, scarring, darker or lighter pigmentary change, recurrence, incomplete removal and infection.
Number Of Freeze-Thaw Cycles: 1 freeze-thaw cycle
Post-Care Instructions: I reviewed with the patient in detail post-care instructions. Patient is to wear sunprotection, and avoid picking at any of the treated lesions. Pt may apply Vaseline to crusted or scabbing areas.
Detail Level: Detailed
Duration Of Freeze Thaw-Cycle (Seconds): 11

## 2020-11-11 ENCOUNTER — TELEPHONE (OUTPATIENT)
Dept: ORTHOPEDIC SURGERY | Facility: CLINIC | Age: 35
End: 2020-11-11

## 2020-11-11 NOTE — TELEPHONE ENCOUNTER
"I received a text page from our after hours answering service.  I returned a call to the pt. Patient states that her L knee is \"locked\" in a flexed position.  She cannot extend the knee.  She had noticed some new popping and clicking over the past 1 week, but it has never locked.  Now it is stuck.  I discussed possible pathology, including flap meniscus tear.  I would like for her to come in for further evaluation and examination of the knee.  She will call in tomorrow morning first thing for an appointment.    Khadar POSADAS \"Chance\" Alex NEWBERRY DO, CAQSM  11/11/20  17:31 EST      "

## 2020-11-16 ENCOUNTER — OFFICE VISIT (OUTPATIENT)
Dept: ORTHOPEDIC SURGERY | Facility: CLINIC | Age: 35
End: 2020-11-16

## 2020-11-16 VITALS
DIASTOLIC BLOOD PRESSURE: 66 MMHG | BODY MASS INDEX: 24.2 KG/M2 | HEART RATE: 73 BPM | SYSTOLIC BLOOD PRESSURE: 90 MMHG | WEIGHT: 169 LBS | HEIGHT: 70 IN

## 2020-11-16 DIAGNOSIS — M23.92 INTERNAL DERANGEMENT OF LEFT KNEE: Primary | ICD-10-CM

## 2020-11-16 DIAGNOSIS — M25.562 ACUTE PAIN OF LEFT KNEE: ICD-10-CM

## 2020-11-16 PROCEDURE — 99213 OFFICE O/P EST LOW 20 MIN: CPT | Performed by: FAMILY MEDICINE

## 2020-11-16 NOTE — PROGRESS NOTES
"Primary Care Sports Medicine Office Visit Note     Patient ID: Brandi Martinez is a 35 y.o. female.    Chief Complaint:  Chief Complaint   Patient presents with   • Left Knee - Pain     HPI:    Ms. Brandi Martinez is a 35 y.o. female who presents to the clinic today for follow up evaluation of R sided hip and knee pain, due to known lumbar pathology. Has continued Neurosurgical work-up for foot drop on the R.     Due to this, she has been using the contralateral knee significantly more. Since footdrop and R sided pain has presented, she continues to have now L sided knee pain. She complains of clicking, and now locking of this knee. Improves with movement, \"unlocking\". She occasionally has manual hyperflex the knee up to her chest and internally/externally rotate the knee when she feels a pop.     Past Medical History:   Diagnosis Date   • Anemia    • Asthma        Past Surgical History:   Procedure Laterality Date   • APPENDECTOMY      [place]    •  SECTION     •  SECTION     •  SECTION     • HYSTERECTOMY      [facility]   • LAPAROSCOPIC CHOLECYSTECTOMY     • SALPINGECTOMY      [place]       Family History   Problem Relation Age of Onset   • Cancer Mother    • Heart disease Father      Social History     Occupational History   • Not on file   Tobacco Use   • Smoking status: Current Every Day Smoker     Packs/day: 0.40     Types: Cigarettes   • Smokeless tobacco: Never Used   Substance and Sexual Activity   • Alcohol use: Yes     Frequency: Monthly or less     Drinks per session: 1 or 2     Binge frequency: Never     Comment: rare   • Drug use: No   • Sexual activity: Defer      Review of Systems   Constitutional: Negative for activity change and fever.   Musculoskeletal: Positive for arthralgias.   Skin: Negative for color change and rash.   Neurological: Negative for weakness.       Objective:    BP 90/66   Pulse 73   Ht 177.8 cm (70\")   Wt 76.7 kg (169 lb)  " " BMI 24.25 kg/m²     Physical Examination:  Physical Exam  Vitals signs and nursing note reviewed.   Constitutional:       General: She is not in acute distress.     Appearance: She is well-developed. She is not diaphoretic.   HENT:      Head: Normocephalic and atraumatic.   Eyes:      Conjunctiva/sclera: Conjunctivae normal.   Pulmonary:      Effort: Pulmonary effort is normal. No respiratory distress.   Musculoskeletal:      Left knee: She exhibits no effusion.   Skin:     General: Skin is warm.      Capillary Refill: Capillary refill takes less than 2 seconds.   Neurological:      Mental Status: She is alert.       Left Ankle Exam     Range of Motion   The patient has normal left ankle ROM.       Left Knee Exam     Muscle Strength   The patient has normal left knee strength.    Tenderness   The patient is experiencing tenderness in the medial joint line.    Range of Motion   The patient has normal left knee ROM.  Extension: normal   Flexion: normal     Tests   Zaria:  Medial - positive Lateral - negative  Varus: negative Valgus: negative  Lachman:  Anterior - negative      Drawer:  Anterior - negative     Posterior - negative    Other   Erythema: absent  Effusion: no effusion present      Left Hip Exam     Range of Motion   The patient has normal left hip ROM.          Imaging and other tests:  Three-view XR of the left and bilateral knees yields little to no bony abnormality.  Essentially negative XR left knee.    Assessment and Plan:    1. Acute pain of left knee  - XR Knee 3 View Left  - MRI Knee Left Without Contrast; Future    2. Internal derangement of left knee  - MRI Knee Left Without Contrast; Future    I discussed with this patient potential pathology and all treatment options.  Patient would like to further evaluate meniscus with advanced imaging.  We will get MRI left knee, RTC in 5-7 days to discuss results and treatment options at that time.     Khadar POSADAS \"Chance\" Alex NEWBERRY, DO, " CAQSM  11/16/20  13:01 EST    Disclaimer: Please note that areas of this note were completed with computer voice recognition software.  Quite often unanticipated grammatical, syntax, homophones, and other interpretive errors are inadvertently transcribed by the computer software. Please excuse any errors that have escaped final proofreading.

## 2020-11-24 ENCOUNTER — TELEPHONE (OUTPATIENT)
Dept: ORTHOPEDIC SURGERY | Facility: CLINIC | Age: 35
End: 2020-11-24

## 2020-11-24 NOTE — TELEPHONE ENCOUNTER
Caller: NOAH     Relationship to patient: [employer] AUTHORIZATION DEPARTMENT    Best call back number: [phone] (DIRECT)     Chief complaint: STATED PATIENT'S LEFT KNEE MRI STILL IN NURSE REVIEW - ASKING IF DO NOT HAVE AUTHORIZATION BY 1500 TODAY TUES 11/24 OK TO SCHEDULE PATIENT A FEW DAYS OUT TILL GET AUTHORIZATION?     Type of visit: LEFT KNEE MRI SCHEDULED [facility] WED 11/25 AT 1400 ORDERED BY DR. MANZANARES     CALL BACK [phone] (DIRECT)    THANKS

## 2020-11-25 ENCOUNTER — APPOINTMENT (OUTPATIENT)
Dept: MRI IMAGING | Facility: HOSPITAL | Age: 35
End: 2020-11-25

## 2020-12-03 ENCOUNTER — APPOINTMENT (OUTPATIENT)
Dept: MRI IMAGING | Facility: HOSPITAL | Age: 35
End: 2020-12-03

## 2021-04-05 ENCOUNTER — HOSPITAL ENCOUNTER (EMERGENCY)
Facility: HOSPITAL | Age: 36
Discharge: HOME | End: 2021-04-05
Attending: EMERGENCY MEDICINE | Admitting: EMERGENCY MEDICINE

## 2021-04-05 ENCOUNTER — APPOINTMENT (OUTPATIENT)
Dept: CT IMAGING | Facility: HOSPITAL | Age: 36
End: 2021-04-05

## 2021-04-05 VITALS
RESPIRATION RATE: 16 BRPM | WEIGHT: 167.55 LBS | SYSTOLIC BLOOD PRESSURE: 117 MMHG | HEIGHT: 70 IN | DIASTOLIC BLOOD PRESSURE: 63 MMHG | HEART RATE: 84 BPM | OXYGEN SATURATION: 96 % | TEMPERATURE: 98.1 F | BODY MASS INDEX: 23.99 KG/M2

## 2021-04-05 DIAGNOSIS — R10.31 ACUTE RIGHT LOWER QUADRANT PAIN: Primary | ICD-10-CM

## 2021-04-05 LAB
ALBUMIN SERPL-MCNC: 4.3 G/DL (ref 3.5–5.2)
ALBUMIN/GLOB SERPL: 1.4 G/DL
ALP SERPL-CCNC: 73 U/L (ref 39–117)
ALT SERPL W P-5'-P-CCNC: 16 U/L (ref 1–33)
ANION GAP SERPL CALCULATED.3IONS-SCNC: 14 MMOL/L (ref 5–15)
AST SERPL-CCNC: 26 U/L (ref 1–32)
BACTERIA UR QL AUTO: ABNORMAL /HPF
BASOPHILS # BLD AUTO: 0.1 10*3/MM3 (ref 0–0.2)
BASOPHILS NFR BLD AUTO: 0.9 % (ref 0–1.5)
BILIRUB SERPL-MCNC: 0.3 MG/DL (ref 0–1.2)
BILIRUB UR QL STRIP: NEGATIVE
BUN SERPL-MCNC: 14 MG/DL (ref 6–20)
BUN/CREAT SERPL: 20.9 (ref 7–25)
CALCIUM SPEC-SCNC: 8.9 MG/DL (ref 8.6–10.5)
CHLORIDE SERPL-SCNC: 105 MMOL/L (ref 98–107)
CLARITY UR: ABNORMAL
CO2 SERPL-SCNC: 21 MMOL/L (ref 22–29)
COLOR UR: YELLOW
CREAT SERPL-MCNC: 0.67 MG/DL (ref 0.57–1)
DEPRECATED RDW RBC AUTO: 45.5 FL (ref 37–54)
EOSINOPHIL # BLD AUTO: 0.2 10*3/MM3 (ref 0–0.4)
EOSINOPHIL NFR BLD AUTO: 3 % (ref 0.3–6.2)
ERYTHROCYTE [DISTWIDTH] IN BLOOD BY AUTOMATED COUNT: 14.4 % (ref 12.3–15.4)
GFR SERPL CREATININE-BSD FRML MDRD: 101 ML/MIN/1.73
GLOBULIN UR ELPH-MCNC: 3 GM/DL
GLUCOSE SERPL-MCNC: 85 MG/DL (ref 65–99)
GLUCOSE UR STRIP-MCNC: NEGATIVE MG/DL
HCT VFR BLD AUTO: 35.1 % (ref 34–46.6)
HGB BLD-MCNC: 11.9 G/DL (ref 12–15.9)
HGB UR QL STRIP.AUTO: ABNORMAL
HYALINE CASTS UR QL AUTO: ABNORMAL /LPF
KETONES UR QL STRIP: NEGATIVE
LEUKOCYTE ESTERASE UR QL STRIP.AUTO: NEGATIVE
LIPASE SERPL-CCNC: 34 U/L (ref 13–60)
LYMPHOCYTES # BLD AUTO: 3.9 10*3/MM3 (ref 0.7–3.1)
LYMPHOCYTES NFR BLD AUTO: 50.5 % (ref 19.6–45.3)
MCH RBC QN AUTO: 30.3 PG (ref 26.6–33)
MCHC RBC AUTO-ENTMCNC: 33.9 G/DL (ref 31.5–35.7)
MCV RBC AUTO: 89.4 FL (ref 79–97)
MONOCYTES # BLD AUTO: 0.5 10*3/MM3 (ref 0.1–0.9)
MONOCYTES NFR BLD AUTO: 6.1 % (ref 5–12)
NEUTROPHILS NFR BLD AUTO: 3.1 10*3/MM3 (ref 1.7–7)
NEUTROPHILS NFR BLD AUTO: 39.5 % (ref 42.7–76)
NITRITE UR QL STRIP: NEGATIVE
NRBC BLD AUTO-RTO: 0.1 /100 WBC (ref 0–0.2)
PH UR STRIP.AUTO: 7 [PH] (ref 5–8)
PLATELET # BLD AUTO: 248 10*3/MM3 (ref 140–450)
PMV BLD AUTO: 8.8 FL (ref 6–12)
POTASSIUM SERPL-SCNC: 4.1 MMOL/L (ref 3.5–5.2)
PROT SERPL-MCNC: 7.3 G/DL (ref 6–8.5)
PROT UR QL STRIP: NEGATIVE
RBC # BLD AUTO: 3.93 10*6/MM3 (ref 3.77–5.28)
RBC # UR: ABNORMAL /HPF
REF LAB TEST METHOD: ABNORMAL
SODIUM SERPL-SCNC: 140 MMOL/L (ref 136–145)
SP GR UR STRIP: 1.02 (ref 1–1.03)
SQUAMOUS #/AREA URNS HPF: ABNORMAL /HPF
UROBILINOGEN UR QL STRIP: ABNORMAL
WBC # BLD AUTO: 7.8 10*3/MM3 (ref 3.4–10.8)
WBC UR QL AUTO: ABNORMAL /HPF

## 2021-04-05 PROCEDURE — 99283 EMERGENCY DEPT VISIT LOW MDM: CPT

## 2021-04-05 PROCEDURE — 74177 CT ABD & PELVIS W/CONTRAST: CPT

## 2021-04-05 PROCEDURE — 25010000002 HYDROMORPHONE PER 4 MG: Performed by: EMERGENCY MEDICINE

## 2021-04-05 PROCEDURE — 96375 TX/PRO/DX INJ NEW DRUG ADDON: CPT

## 2021-04-05 PROCEDURE — 81001 URINALYSIS AUTO W/SCOPE: CPT | Performed by: EMERGENCY MEDICINE

## 2021-04-05 PROCEDURE — 80053 COMPREHEN METABOLIC PANEL: CPT | Performed by: EMERGENCY MEDICINE

## 2021-04-05 PROCEDURE — 83690 ASSAY OF LIPASE: CPT | Performed by: EMERGENCY MEDICINE

## 2021-04-05 PROCEDURE — 96374 THER/PROPH/DIAG INJ IV PUSH: CPT

## 2021-04-05 PROCEDURE — 0 IOPAMIDOL PER 1 ML: Performed by: EMERGENCY MEDICINE

## 2021-04-05 PROCEDURE — 25010000002 PROCHLORPERAZINE 10 MG/2ML SOLUTION: Performed by: EMERGENCY MEDICINE

## 2021-04-05 PROCEDURE — 85025 COMPLETE CBC W/AUTO DIFF WBC: CPT | Performed by: EMERGENCY MEDICINE

## 2021-04-05 RX ORDER — PROCHLORPERAZINE EDISYLATE 5 MG/ML
10 INJECTION INTRAMUSCULAR; INTRAVENOUS ONCE
Status: COMPLETED | OUTPATIENT
Start: 2021-04-05 | End: 2021-04-05

## 2021-04-05 RX ORDER — SODIUM CHLORIDE 0.9 % (FLUSH) 0.9 %
10 SYRINGE (ML) INJECTION AS NEEDED
Status: DISCONTINUED | OUTPATIENT
Start: 2021-04-05 | End: 2021-04-06 | Stop reason: HOSPADM

## 2021-04-05 RX ORDER — HYDROMORPHONE HCL 110MG/55ML
1 PATIENT CONTROLLED ANALGESIA SYRINGE INTRAVENOUS ONCE
Status: COMPLETED | OUTPATIENT
Start: 2021-04-05 | End: 2021-04-05

## 2021-04-05 RX ADMIN — HYDROMORPHONE HYDROCHLORIDE 1 MG: 2 INJECTION, SOLUTION INTRAMUSCULAR; INTRAVENOUS; SUBCUTANEOUS at 21:12

## 2021-04-05 RX ADMIN — IOPAMIDOL 100 ML: 755 INJECTION, SOLUTION INTRAVENOUS at 22:23

## 2021-04-05 RX ADMIN — PROCHLORPERAZINE EDISYLATE 10 MG: 5 INJECTION INTRAMUSCULAR; INTRAVENOUS at 21:11

## 2021-04-06 NOTE — ED PROVIDER NOTES
Subjective   Chief complaint: Abdominal pain    35-year-old female presents with abdominal pain.  Patient states pain is in the right lower quadrant without radiation.  She has had nausea but no vomiting.  She has had diarrhea.  She denies any fever.  No known sick contacts or unusual ingestions.  She denies any alleviating or exacerbating factors.  Pain has been getting progressively worse over the past 3 days.      History provided by:  Patient      Review of Systems   Constitutional: Negative for fever.   HENT: Negative for congestion and sore throat.    Eyes: Negative for redness.   Respiratory: Negative for cough and shortness of breath.    Cardiovascular: Negative for chest pain.   Gastrointestinal: Positive for abdominal pain, diarrhea and nausea. Negative for vomiting.   Genitourinary: Negative for dysuria.   Musculoskeletal: Negative for back pain.   Skin: Negative for rash.   Neurological: Negative for dizziness and headaches.   Psychiatric/Behavioral: Negative for confusion.       Past Medical History:   Diagnosis Date   • Anemia    • Asthma        Allergies   Allergen Reactions   • Ketorolac Shortness Of Breath     Decreases lung function   • Morphine Itching and Swelling   • Ondansetron Hcl Other (See Comments)   • Topiramate Unknown (See Comments)     Kidney stones   • Butorphanol Anxiety       Past Surgical History:   Procedure Laterality Date   • APPENDECTOMY      [place]    •  SECTION  2004   •  SECTION  2006   •  SECTION     • HYSTERECTOMY      [facility]   • LAPAROSCOPIC CHOLECYSTECTOMY     • SALPINGECTOMY      [place]       Family History   Problem Relation Age of Onset   • Cancer Mother    • Heart disease Father        Social History     Socioeconomic History   • Marital status:      Spouse name: Not on file   • Number of children: Not on file   • Years of education: Not on file   • Highest education level: Not on file   Tobacco Use   • Smoking  "status: Current Every Day Smoker     Packs/day: 0.40     Types: Cigarettes   • Smokeless tobacco: Never Used   Substance and Sexual Activity   • Alcohol use: Yes     Comment: rare   • Drug use: No   • Sexual activity: Defer       /67   Pulse 95   Temp 98.1 °F (36.7 °C) (Oral)   Resp 18   Ht 177.8 cm (70\")   Wt 76 kg (167 lb 8.8 oz)   SpO2 93%   BMI 24.04 kg/m²       Objective   Physical Exam  Vitals and nursing note reviewed.   Constitutional:       Appearance: She is well-developed.   HENT:      Head: Normocephalic and atraumatic.   Eyes:      Extraocular Movements: Extraocular movements intact.      Pupils: Pupils are equal, round, and reactive to light.   Cardiovascular:      Rate and Rhythm: Normal rate and regular rhythm.      Heart sounds: Normal heart sounds.   Pulmonary:      Effort: Pulmonary effort is normal. No respiratory distress.      Breath sounds: Normal breath sounds.   Abdominal:      General: Bowel sounds are normal.      Palpations: Abdomen is soft.      Tenderness: There is abdominal tenderness in the right lower quadrant. There is no guarding or rebound.   Musculoskeletal:         General: Normal range of motion.      Cervical back: Normal range of motion and neck supple.   Skin:     General: Skin is warm and dry.   Neurological:      General: No focal deficit present.      Mental Status: She is alert and oriented to person, place, and time.         Procedures           ED Course      Results for orders placed or performed during the hospital encounter of 04/05/21   Comprehensive Metabolic Panel    Specimen: Blood   Result Value Ref Range    Glucose 85 65 - 99 mg/dL    BUN 14 6 - 20 mg/dL    Creatinine 0.67 0.57 - 1.00 mg/dL    Sodium 140 136 - 145 mmol/L    Potassium 4.1 3.5 - 5.2 mmol/L    Chloride 105 98 - 107 mmol/L    CO2 21.0 (L) 22.0 - 29.0 mmol/L    Calcium 8.9 8.6 - 10.5 mg/dL    Total Protein 7.3 6.0 - 8.5 g/dL    Albumin 4.30 3.50 - 5.20 g/dL    ALT (SGPT) 16 1 - 33 U/L "    AST (SGOT) 26 1 - 32 U/L    Alkaline Phosphatase 73 39 - 117 U/L    Total Bilirubin 0.3 0.0 - 1.2 mg/dL    eGFR Non African Amer 101 >60 mL/min/1.73    Globulin 3.0 gm/dL    A/G Ratio 1.4 g/dL    BUN/Creatinine Ratio 20.9 7.0 - 25.0    Anion Gap 14.0 5.0 - 15.0 mmol/L   Lipase    Specimen: Blood   Result Value Ref Range    Lipase 34 13 - 60 U/L   Urinalysis With Culture If Indicated - Urine, Clean Catch    Specimen: Urine, Clean Catch   Result Value Ref Range    Color, UA Yellow Yellow, Straw    Appearance, UA Cloudy (A) Clear    pH, UA 7.0 5.0 - 8.0    Specific Gravity, UA 1.018 1.005 - 1.030    Glucose, UA Negative Negative    Ketones, UA Negative Negative    Bilirubin, UA Negative Negative    Blood, UA Small (1+) (A) Negative    Protein, UA Negative Negative    Leuk Esterase, UA Negative Negative    Nitrite, UA Negative Negative    Urobilinogen, UA 0.2 E.U./dL 0.2 - 1.0 E.U./dL   CBC Auto Differential    Specimen: Blood   Result Value Ref Range    WBC 7.80 3.40 - 10.80 10*3/mm3    RBC 3.93 3.77 - 5.28 10*6/mm3    Hemoglobin 11.9 (L) 12.0 - 15.9 g/dL    Hematocrit 35.1 34.0 - 46.6 %    MCV 89.4 79.0 - 97.0 fL    MCH 30.3 26.6 - 33.0 pg    MCHC 33.9 31.5 - 35.7 g/dL    RDW 14.4 12.3 - 15.4 %    RDW-SD 45.5 37.0 - 54.0 fl    MPV 8.8 6.0 - 12.0 fL    Platelets 248 140 - 450 10*3/mm3    Neutrophil % 39.5 (L) 42.7 - 76.0 %    Lymphocyte % 50.5 (H) 19.6 - 45.3 %    Monocyte % 6.1 5.0 - 12.0 %    Eosinophil % 3.0 0.3 - 6.2 %    Basophil % 0.9 0.0 - 1.5 %    Neutrophils, Absolute 3.10 1.70 - 7.00 10*3/mm3    Lymphocytes, Absolute 3.90 (H) 0.70 - 3.10 10*3/mm3    Monocytes, Absolute 0.50 0.10 - 0.90 10*3/mm3    Eosinophils, Absolute 0.20 0.00 - 0.40 10*3/mm3    Basophils, Absolute 0.10 0.00 - 0.20 10*3/mm3    nRBC 0.1 0.0 - 0.2 /100 WBC   Urinalysis, Microscopic Only - Urine, Clean Catch    Specimen: Urine, Clean Catch   Result Value Ref Range    RBC, UA None Seen None Seen /HPF    WBC, UA None Seen None Seen /HPF     Bacteria, UA Trace (A) None Seen /HPF    Squamous Epithelial Cells, UA 0-2 None Seen, 0-2 /HPF    Hyaline Casts, UA None Seen None Seen /LPF    Methodology Manual Light Microscopy                                           MDM   Labs have been unremarkable.  CT of the abdomen and pelvis is pending at this time.  Care will be transferred to nurse practitioner Kori pending results of CT.    Patient lab work fairly unremarkable there is no white count lipase is normal kidney function liver enzymes alk phos total bili and GFR normal.  Patient pain was right lower quadrant nonradiating per Dr. Chery's HPI and physical assessment.  CT abdomen pelvis was negative for any acute process.  Did show mild dilatation of biliary duct however this is likely a normal finding postcholecystectomy as  pain is localized to right lower quadrant and bilirubin is normal.  Pain could be from adhesions but no definitive finding on CT to explain patient right lower quadrant pain.  Patient is status post cholecystectomy appendectomy hysterectomy.  Discussed results with patient offered Zofran for any nausea states she has Phenergan at home.  Discussed emergent reasons to return to ER otherwise follow-up primary care provider.  Patient is afebrile and not tachycardic.  Stable for discharge.    Prescription: None        Final diagnoses:   Acute right lower quadrant pain       ED Disposition  ED Disposition     ED Disposition Condition Comment    Discharge Stable           No follow-up provider specified.       Medication List      No changes were made to your prescriptions during this visit.          Kori Wilburn, APRN  04/05/21 [id number]

## 2022-05-24 ENCOUNTER — HOSPITAL ENCOUNTER (EMERGENCY)
Facility: HOSPITAL | Age: 37
Discharge: HOME | End: 2022-05-24
Attending: EMERGENCY MEDICINE | Admitting: EMERGENCY MEDICINE

## 2022-05-24 ENCOUNTER — APPOINTMENT (OUTPATIENT)
Dept: MRI IMAGING | Facility: HOSPITAL | Age: 37
End: 2022-05-24

## 2022-05-24 VITALS
HEART RATE: 69 BPM | RESPIRATION RATE: 13 BRPM | WEIGHT: 175.27 LBS | SYSTOLIC BLOOD PRESSURE: 119 MMHG | BODY MASS INDEX: 24.54 KG/M2 | HEIGHT: 71 IN | DIASTOLIC BLOOD PRESSURE: 73 MMHG | TEMPERATURE: 97.8 F | OXYGEN SATURATION: 100 %

## 2022-05-24 DIAGNOSIS — M51.9 LUMBAR DISC DISEASE: Primary | ICD-10-CM

## 2022-05-24 PROCEDURE — 99283 EMERGENCY DEPT VISIT LOW MDM: CPT

## 2022-05-24 PROCEDURE — 72148 MRI LUMBAR SPINE W/O DYE: CPT

## 2022-05-24 RX ORDER — NAPROXEN 500 MG/1
500 TABLET ORAL 2 TIMES DAILY WITH MEALS
Qty: 10 TABLET | Refills: 0 | Status: SHIPPED | OUTPATIENT
Start: 2022-05-24 | End: 2022-05-29

## 2022-05-24 RX ORDER — HYDROCODONE BITARTRATE AND ACETAMINOPHEN 10; 325 MG/1; MG/1
1 TABLET ORAL EVERY 6 HOURS PRN
Qty: 20 TABLET | Refills: 0 | Status: SHIPPED | OUTPATIENT
Start: 2022-05-24

## 2022-05-24 RX ORDER — OXYCODONE HYDROCHLORIDE 5 MG/1
10 TABLET ORAL EVERY 4 HOURS PRN
Status: COMPLETED | OUTPATIENT
Start: 2022-05-24 | End: 2022-05-24

## 2022-05-24 RX ORDER — METHOCARBAMOL 500 MG/1
500 TABLET, FILM COATED ORAL 4 TIMES DAILY
Qty: 20 TABLET | Refills: 0 | Status: SHIPPED | OUTPATIENT
Start: 2022-05-24 | End: 2022-05-29

## 2022-05-24 RX ADMIN — OXYCODONE 10 MG: 5 TABLET ORAL at 14:10

## 2022-05-24 NOTE — ED NOTES
Patient has low back pain that radiates down both legs. Right leg feels numb. Had back surgery last year

## 2022-05-24 NOTE — ED PROVIDER NOTES
Subjective   History of Present Illness  This is a 36-year-old female with a history of chronic back pain.  She had surgery about a year ago for her back.  She states that 3 days ago she awoke having severe pain across the lower part of the back with radiation down to the right leg and had some right foot drop.  She states that she had foot drop about a year ago prior to her surgery.  Denies any numbness.  There is no history of any trauma.  The patient has had a previous hysterectomy.  Review of Systems   Constitutional: Positive for activity change.   HENT: Negative.    Eyes: Negative.    Respiratory: Negative.    Cardiovascular: Negative.    Gastrointestinal: Negative.    Endocrine: Negative.    Genitourinary: Negative.    Musculoskeletal: Positive for back pain.   Skin: Negative.    Allergic/Immunologic: Negative.    Neurological: Positive for weakness.   Hematological: Negative.    Psychiatric/Behavioral: Negative.        Past Medical History:   Diagnosis Date   • Anemia    • Asthma        Allergies   Allergen Reactions   • Ketorolac Shortness Of Breath     Decreases lung function   • Morphine Itching and Swelling   • Ondansetron Hcl Other (See Comments)   • Topiramate Unknown (See Comments)     Kidney stones   • Butorphanol Anxiety       Past Surgical History:   Procedure Laterality Date   • APPENDECTOMY      [place]    •  SECTION  2004   •  SECTION     •  SECTION     • HYSTERECTOMY      [facility]   • LAPAROSCOPIC CHOLECYSTECTOMY     • SALPINGECTOMY      [place]       Family History   Problem Relation Age of Onset   • Cancer Mother    • Heart disease Father        Social History     Socioeconomic History   • Marital status:    Tobacco Use   • Smoking status: Current Every Day Smoker     Packs/day: 0.40     Types: Cigarettes   • Smokeless tobacco: Never Used   Substance and Sexual Activity   • Alcohol use: Yes     Comment: rare   • Drug use: No   • Sexual  activity: Defer           Objective   Physical Exam  Patient is awake and alert she is afebrile vital signs are stable the HEENT exam is unremarkable the neck is supple cardiovascular exam reveals regular rhythm her abdomen is soft bowel sounds positive there is no guarding or rebound the patient has diffuse tenderness across the lower back on the right and on the left the reflexes are 2+ and symmetrical she has some radicular pain with right straight leg raising.  Procedures           ED Course           Insert ED course                     There was no evidence of any acute change.                  MDM  The patient has no corresponding MRI findings to her clinical findings.  The patient will be started on meds for pain and muscle relaxant and anti-inflammatory over the next few days.  She is to follow-up with her doctor if she has persistent symptoms.  I did not see any evidence of foot drop  Final diagnoses:   Lumbar disc disease       ED Disposition  ED Disposition     None          No follow-up provider specified.       Medication List      No changes were made to your prescriptions during this visit.          Michael Haas MD  05/24/22 2373

## 2022-05-24 NOTE — DISCHARGE INSTRUCTIONS
Vicodin 4 times a day for pain.  Robaxin 4 times a day for spasm.  Naprosyn 2 times a day to reduce inflammation and pain.  Follow-up with your doctor if not improved in 5 days

## 2022-06-01 ENCOUNTER — TELEPHONE (OUTPATIENT)
Dept: NEUROSURGERY | Facility: CLINIC | Age: 37
End: 2022-06-01

## 2022-06-01 NOTE — TELEPHONE ENCOUNTER
MS LISA CLD TO CHECK REFERRAL STATUS    [facility] OFFICE     WILL CHECK WITH DR HUERTAS FOR APPROVAL AND ONCE APPROVED WILL SEND     INFORMED MS GONZALEZ

## 2022-06-08 ENCOUNTER — TELEPHONE (OUTPATIENT)
Dept: NEUROSURGERY | Facility: OTHER | Age: 37
End: 2022-06-08

## 2022-06-08 NOTE — TELEPHONE ENCOUNTER
PT CALLED AND STATED THAT PCP HAD SENT OVER A REFERRAL TO OUR OFFICE-CALLED TO CONFIRM WITH HER PCP BUT THERE WAS NOT ANYTHING IN THE CHART-THE LADY WHO TOOK MY CALL WAS CHECKING WITH  AND WILL CALL BACK

## 2022-11-23 ENCOUNTER — HOSPITAL ENCOUNTER (EMERGENCY)
Facility: HOSPITAL | Age: 37
Discharge: HOME | End: 2022-11-23
Attending: EMERGENCY MEDICINE | Admitting: EMERGENCY MEDICINE

## 2022-11-23 ENCOUNTER — APPOINTMENT (OUTPATIENT)
Dept: GENERAL RADIOLOGY | Facility: HOSPITAL | Age: 37
End: 2022-11-23

## 2022-11-23 VITALS
WEIGHT: 177.03 LBS | TEMPERATURE: 97.6 F | HEIGHT: 70 IN | BODY MASS INDEX: 25.34 KG/M2 | OXYGEN SATURATION: 100 % | RESPIRATION RATE: 18 BRPM | DIASTOLIC BLOOD PRESSURE: 65 MMHG | HEART RATE: 102 BPM | SYSTOLIC BLOOD PRESSURE: 95 MMHG

## 2022-11-23 DIAGNOSIS — R51.9 NONINTRACTABLE HEADACHE, UNSPECIFIED CHRONICITY PATTERN, UNSPECIFIED HEADACHE TYPE: ICD-10-CM

## 2022-11-23 DIAGNOSIS — J10.1 INFLUENZA A: Primary | ICD-10-CM

## 2022-11-23 DIAGNOSIS — R11.0 NAUSEA: ICD-10-CM

## 2022-11-23 LAB
ALBUMIN SERPL-MCNC: 4.2 G/DL (ref 3.5–5.2)
ALBUMIN/GLOB SERPL: 1.6 G/DL
ALP SERPL-CCNC: 62 U/L (ref 39–117)
ALT SERPL W P-5'-P-CCNC: 27 U/L (ref 1–33)
ANION GAP SERPL CALCULATED.3IONS-SCNC: 10 MMOL/L (ref 5–15)
AST SERPL-CCNC: 29 U/L (ref 1–32)
BASOPHILS # BLD AUTO: 0 10*3/MM3 (ref 0–0.2)
BASOPHILS NFR BLD AUTO: 0.6 % (ref 0–1.5)
BILIRUB SERPL-MCNC: 0.3 MG/DL (ref 0–1.2)
BUN SERPL-MCNC: 8 MG/DL (ref 6–20)
BUN/CREAT SERPL: 17 (ref 7–25)
CALCIUM SPEC-SCNC: 8.7 MG/DL (ref 8.6–10.5)
CHLORIDE SERPL-SCNC: 103 MMOL/L (ref 98–107)
CO2 SERPL-SCNC: 26 MMOL/L (ref 22–29)
CREAT SERPL-MCNC: 0.47 MG/DL (ref 0.57–1)
D DIMER PPP FEU-MCNC: 0.31 MG/L (FEU) (ref 0–0.59)
DEPRECATED RDW RBC AUTO: 46.4 FL (ref 37–54)
EGFRCR SERPLBLD CKD-EPI 2021: 127.5 ML/MIN/1.73
EOSINOPHIL # BLD AUTO: 0 10*3/MM3 (ref 0–0.4)
EOSINOPHIL NFR BLD AUTO: 0.8 % (ref 0.3–6.2)
ERYTHROCYTE [DISTWIDTH] IN BLOOD BY AUTOMATED COUNT: 13.8 % (ref 12.3–15.4)
FLUAV SUBTYP SPEC NAA+PROBE: DETECTED
FLUBV RNA ISLT QL NAA+PROBE: NOT DETECTED
GLOBULIN UR ELPH-MCNC: 2.7 GM/DL
GLUCOSE SERPL-MCNC: 92 MG/DL (ref 65–99)
HCT VFR BLD AUTO: 32.8 % (ref 34–46.6)
HGB BLD-MCNC: 11 G/DL (ref 12–15.9)
LYMPHOCYTES # BLD AUTO: 1.1 10*3/MM3 (ref 0.7–3.1)
LYMPHOCYTES NFR BLD AUTO: 17.8 % (ref 19.6–45.3)
MCH RBC QN AUTO: 30.3 PG (ref 26.6–33)
MCHC RBC AUTO-ENTMCNC: 33.7 G/DL (ref 31.5–35.7)
MCV RBC AUTO: 90 FL (ref 79–97)
MONOCYTES # BLD AUTO: 0.5 10*3/MM3 (ref 0.1–0.9)
MONOCYTES NFR BLD AUTO: 7.6 % (ref 5–12)
NEUTROPHILS NFR BLD AUTO: 4.5 10*3/MM3 (ref 1.7–7)
NEUTROPHILS NFR BLD AUTO: 73.2 % (ref 42.7–76)
NRBC BLD AUTO-RTO: 0 /100 WBC (ref 0–0.2)
NT-PROBNP SERPL-MCNC: 72.4 PG/ML (ref 0–450)
PLATELET # BLD AUTO: 210 10*3/MM3 (ref 140–450)
PMV BLD AUTO: 8.6 FL (ref 6–12)
POTASSIUM SERPL-SCNC: 4.3 MMOL/L (ref 3.5–5.2)
PROT SERPL-MCNC: 6.9 G/DL (ref 6–8.5)
RBC # BLD AUTO: 3.65 10*6/MM3 (ref 3.77–5.28)
SARS-COV-2 RNA PNL SPEC NAA+PROBE: NOT DETECTED
SODIUM SERPL-SCNC: 139 MMOL/L (ref 136–145)
TROPONIN T SERPL-MCNC: <0.01 NG/ML (ref 0–0.03)
WBC NRBC COR # BLD: 6.1 10*3/MM3 (ref 3.4–10.8)

## 2022-11-23 PROCEDURE — 80053 COMPREHEN METABOLIC PANEL: CPT | Performed by: NURSE PRACTITIONER

## 2022-11-23 PROCEDURE — 87502 INFLUENZA DNA AMP PROBE: CPT | Performed by: NURSE PRACTITIONER

## 2022-11-23 PROCEDURE — 96374 THER/PROPH/DIAG INJ IV PUSH: CPT

## 2022-11-23 PROCEDURE — 25010000002 PROCHLORPERAZINE 10 MG/2ML SOLUTION: Performed by: PHYSICIAN ASSISTANT

## 2022-11-23 PROCEDURE — 99284 EMERGENCY DEPT VISIT MOD MDM: CPT

## 2022-11-23 PROCEDURE — 71045 X-RAY EXAM CHEST 1 VIEW: CPT

## 2022-11-23 PROCEDURE — 96375 TX/PRO/DX INJ NEW DRUG ADDON: CPT

## 2022-11-23 PROCEDURE — 87635 SARS-COV-2 COVID-19 AMP PRB: CPT | Performed by: NURSE PRACTITIONER

## 2022-11-23 PROCEDURE — 84484 ASSAY OF TROPONIN QUANT: CPT | Performed by: NURSE PRACTITIONER

## 2022-11-23 PROCEDURE — 36415 COLL VENOUS BLD VENIPUNCTURE: CPT

## 2022-11-23 PROCEDURE — 85025 COMPLETE CBC W/AUTO DIFF WBC: CPT | Performed by: NURSE PRACTITIONER

## 2022-11-23 PROCEDURE — 85379 FIBRIN DEGRADATION QUANT: CPT | Performed by: NURSE PRACTITIONER

## 2022-11-23 PROCEDURE — 25010000002 DIPHENHYDRAMINE PER 50 MG: Performed by: PHYSICIAN ASSISTANT

## 2022-11-23 PROCEDURE — 93005 ELECTROCARDIOGRAM TRACING: CPT

## 2022-11-23 PROCEDURE — 83880 ASSAY OF NATRIURETIC PEPTIDE: CPT | Performed by: NURSE PRACTITIONER

## 2022-11-23 RX ORDER — PROCHLORPERAZINE EDISYLATE 5 MG/ML
10 INJECTION INTRAMUSCULAR; INTRAVENOUS ONCE
Status: COMPLETED | OUTPATIENT
Start: 2022-11-23 | End: 2022-11-23

## 2022-11-23 RX ORDER — SODIUM CHLORIDE 0.9 % (FLUSH) 0.9 %
10 SYRINGE (ML) INJECTION AS NEEDED
Status: DISCONTINUED | OUTPATIENT
Start: 2022-11-23 | End: 2022-11-23 | Stop reason: HOSPADM

## 2022-11-23 RX ORDER — DIPHENHYDRAMINE HYDROCHLORIDE 50 MG/ML
25 INJECTION INTRAMUSCULAR; INTRAVENOUS ONCE
Status: COMPLETED | OUTPATIENT
Start: 2022-11-23 | End: 2022-11-23

## 2022-11-23 RX ORDER — OSELTAMIVIR PHOSPHATE 75 MG/1
75 CAPSULE ORAL 2 TIMES DAILY
Qty: 10 CAPSULE | Refills: 0 | Status: SHIPPED | OUTPATIENT
Start: 2022-11-23

## 2022-11-23 RX ADMIN — PROCHLORPERAZINE EDISYLATE 10 MG: 5 INJECTION INTRAMUSCULAR; INTRAVENOUS at 12:03

## 2022-11-23 RX ADMIN — DIPHENHYDRAMINE HYDROCHLORIDE 25 MG: 50 INJECTION, SOLUTION INTRAMUSCULAR; INTRAVENOUS at 12:03

## 2022-11-23 NOTE — ED PROVIDER NOTES
Subjective      Provider in Triage Note  Patient is a 37-year-old white female with history of alpha-1 antitrypsin deficiency, asthma.  She presents today from home with complaints of shortness of breath worse with exertion.  She also reports a sudden onset of a cough.  She denies any fever or chills.  She complains of some discomfort and heaviness in her chest.  She denies any leg pain or swelling recent travel or prolonged immobilization.      History of Present Illness  Agree with above HPI.  Patient presents from home complaining of cough shortness of breath fatigue headache and nausea for 1 day.  Patient's sons at home sick with similar symptoms and was diagnosed with the flu recently.  Patient denies any chest pain.  No hemoptysis.  She denies abdominal pain vomiting or diarrhea.  No lower extremity swelling.  No fever chills.    PCP: Ran Villeda    History provided by:  Patient      Review of Systems   Constitutional: Negative for chills and fever.   HENT: Negative for congestion, sore throat and trouble swallowing.    Eyes: Negative.    Respiratory: Positive for cough, chest tightness and shortness of breath. Negative for wheezing.    Cardiovascular: Negative for chest pain.   Gastrointestinal: Positive for nausea. Negative for abdominal pain, diarrhea and vomiting.   Endocrine: Negative.    Genitourinary: Negative for dysuria.   Musculoskeletal: Positive for myalgias.   Skin: Negative for rash.   Allergic/Immunologic: Negative.    Neurological: Positive for headaches. Negative for weakness and light-headedness.   Psychiatric/Behavioral: Negative for behavioral problems.   All other systems reviewed and are negative.      Past Medical History:   Diagnosis Date   • Anemia    • Asthma        Allergies   Allergen Reactions   • Ketorolac Shortness Of Breath     Decreases lung function   • Morphine Itching and Swelling   • Ondansetron Hcl Other (See Comments)   • Topiramate Unknown (See Comments)     Kidney  stones   • Butorphanol Anxiety       Past Surgical History:   Procedure Laterality Date   • APPENDECTOMY  2018    [facility]    •  SECTION  2004   •  SECTION  2006   •  SECTION  2010   • HYSTERECTOMY      [facility]   • LAPAROSCOPIC CHOLECYSTECTOMY     • SALPINGECTOMY  2018    [facility]       Family History   Problem Relation Age of Onset   • Cancer Mother    • Heart disease Father        Social History     Socioeconomic History   • Marital status:    Tobacco Use   • Smoking status: Every Day     Packs/day: 0.40     Types: Cigarettes   • Smokeless tobacco: Never   Substance and Sexual Activity   • Alcohol use: Yes     Comment: rare   • Drug use: No   • Sexual activity: Defer           Objective   Physical Exam  Vitals and nursing note reviewed.   Constitutional:       Appearance: Normal appearance. She is well-developed and normal weight. She is not ill-appearing or toxic-appearing.   HENT:      Head: Normocephalic and atraumatic.   Eyes:      Pupils: Pupils are equal, round, and reactive to light.   Cardiovascular:      Rate and Rhythm: Normal rate and regular rhythm.      Heart sounds: Normal heart sounds. No murmur heard.  Pulmonary:      Effort: Pulmonary effort is normal. No respiratory distress.      Breath sounds: Normal breath sounds. No wheezing, rhonchi or rales.   Chest:      Chest wall: No tenderness.   Abdominal:      General: Bowel sounds are normal. There is no distension.      Palpations: Abdomen is soft.      Tenderness: There is no abdominal tenderness.   Musculoskeletal:         General: Normal range of motion.      Right lower leg: No tenderness. No edema.      Left lower leg: No tenderness. No edema.   Skin:     General: Skin is warm and dry.      Capillary Refill: Capillary refill takes less than 2 seconds.      Findings: No rash.   Neurological:      General: No focal deficit present.      Mental Status: She is alert and oriented to person, place, and time.  "  Psychiatric:         Mood and Affect: Mood normal.         Behavior: Behavior normal.         ECG 12 Lead      Date/Time: 11/23/2022 2:24 PM  Performed by: Deandra Mesa PA  Authorized by: Rob Fernandez MD   Interpreted by physician  Previous ECG: no previous ECG available  Rhythm: sinus rhythm  Rate: normal  BPM: 95  QRS axis: normal  Conduction: conduction normal  ST Segments: ST segments normal  T Waves: T waves normal  Clinical impression: non-specific ECG                 ED Course  ED Course as of 11/23/22 1427   Wed Nov 23, 2022   1236 Patient's lab work hemolyzed [MC]   1303 Blood work needed to be redrawn.  Patient reevaluated, she is resting with eyes closed.  She is in no acute distress.  Vital signs are stable, 9 9% on room air. [MC]      ED Course User Index  [MC] Deandra Mesa PA    /65 (BP Location: Left arm, Patient Position: Lying)   Pulse 85   Temp 97.6 °F (36.4 °C)   Resp 17   Ht 177.8 cm (70\")   Wt 80.3 kg (177 lb 0.5 oz)   SpO2 99%   BMI 25.40 kg/m²   Labs Reviewed   INFLUENZA ANTIGEN, RAPID - Abnormal; Notable for the following components:       Result Value    Influenza A PCR Detected (*)     All other components within normal limits   COMPREHENSIVE METABOLIC PANEL - Abnormal; Notable for the following components:    Creatinine 0.47 (*)     All other components within normal limits    Narrative:     GFR Normal >60  Chronic Kidney Disease <60  Kidney Failure <15     CBC WITH AUTO DIFFERENTIAL - Abnormal; Notable for the following components:    RBC 3.65 (*)     Hemoglobin 11.0 (*)     Hematocrit 32.8 (*)     Lymphocyte % 17.8 (*)     All other components within normal limits   COVID-19,CEPHEID/KIKI,COR/YAEL/PAD/AVA/MAD IN-HOUSE,NP SWAB IN TRANSPORT MEDIA 3-4 HR TAT, RT-PCR - Normal    Narrative:     Fact sheet for providers: https://www.fda.gov/media/865979/download     Fact sheet for patients: https://www.fda.gov/media/520716/download  Fact sheet for providers: " https://www.fda.gov/media/432106/download    Fact sheet for patients: https://www.fda.gov/media/101504/download    Test performed by PCR.   D-DIMER, QUANTITATIVE - Normal    Narrative:     Reference Range  --------------------------------------------------------------------     < 0.50   Negative Predictive Value  0.50-0.59   Indeterminate    >= 0.60   Probable VTE             A very low percentage of patients with DVT may yield D-Dimer results   below the cut-off of 0.50 mg/L FEU.  This is known to be more   prevalent in patients with distal DVT.             Results of this test should always be interpreted in conjunction with   the patient's medical history, clinical presentation and other   findings.  Clinical diagnosis should not be based on the result of   INNOVANCE D-Dimer alone.   BNP (IN-HOUSE) - Normal    Narrative:     Among patients with dyspnea, NT-proBNP is highly sensitive for the detection of acute congestive heart failure. In addition NT-proBNP of <300 pg/ml effectively rules out acute congestive heart failure with 99% negative predictive value.     TROPONIN (IN-HOUSE) - Normal    Narrative:     Troponin T Reference Range:  <= 0.03 ng/mL-   Negative for AMI  >0.03 ng/mL-     Abnormal for myocardial necrosis.  Clinicians would have to utilize clinical acumen, EKG, Troponin and serial changes to determine if it is an Acute Myocardial Infarction or myocardial injury due to an underlying chronic condition.       Results may be falsely decreased if patient taking Biotin.     CBC AND DIFFERENTIAL    Narrative:     The following orders were created for panel order CBC & Differential.  Procedure                               Abnormality         Status                     ---------                               -----------         ------                     CBC Auto Differential[[id number]]        Abnormal            Final result                 Please view results for these tests on the individual orders.      Medications   sodium chloride 0.9 % flush 10 mL (has no administration in time range)   prochlorperazine (COMPAZINE) injection 10 mg (10 mg Intravenous Given 11/23/22 1203)   diphenhydrAMINE (BENADRYL) injection 25 mg (25 mg Intravenous Given 11/23/22 1203)     XR Chest 1 View    Result Date: 11/23/2022  No acute chest finding.  Electronically Signed By-Stephanie Bull MD On:11/23/2022 12:21 PM This report was finalized on [id number] by  Stephanie Bull MD.                                           MDM  Number of Diagnoses or Management Options  Influenza A  Nausea  Nonintractable headache, unspecified chronicity pattern, unspecified headache type  Diagnosis management comments: MEDICAL DECISION  Epic Chart Review: No recent admissions  Comorbidities: Alpha-1 antitrypsin, asthma  Differentials: COVID, flu, pneumonia; this list is not all inclusive and does not constitute the entirety of considered causes  Radiology interpretation:  Images reviewed by me and interpreted by radiologist, as above  Lab interpretation:  Labs viewed by me significant for, as above  EKG interpretation: Reviewed by myself interpreted by ER attending, sinus rhythm rate of 95 with no acute ST changes.    While in the ED IV was placed and labs were obtained appropriate PPE was worn during exam and throughout all encounters with the patient.  Patient had the above evaluation.  IV placed and lab work obtained.  Patient placed on continuous telemetry monitoring throughout ER stay.  Patient complaining of severe headache as well as nausea unrelieved with Phenergan.  She was given Compazine and Benadryl with relief.  Influenza A positive.  COVID-negative.  Lab work unremarkable.  Chest x-ray shows no acute cardiopulmonary abnormalities.  On reevaluation patient reports that she feels better.  Patient is afebrile, nontoxic appearance and in no acute distress.  Patient discharged with prescription for Tamiflu.  Encourage social distancing and  proper hand hygiene.    Discharge plan and instructions were discussed with the patient who verbalized understanding and is in agreement with the plan, all questions were answered at this time.  Patient is aware of signs symptoms that would require immediate return to the emergency room.  Patient understands importance of following up with primary care provider for further evaluation and worsening concerns as well as blood pressure recheck in the next 4 weeks.    Patient was discharged in improved stable condition with an upright steady gait.       Amount and/or Complexity of Data Reviewed  Clinical lab tests: reviewed and ordered  Tests in the radiology section of CPT®: reviewed and ordered  Tests in the medicine section of CPT®: reviewed    Patient Progress  Patient progress: stable      Final diagnoses:   Influenza A   Nonintractable headache, unspecified chronicity pattern, unspecified headache type   Nausea       ED Disposition  ED Disposition     ED Disposition   Discharge    Condition   Stable    Comment   --             Ran Villeda MD  [address] [facility] [address]  [place] [address] [phone]    Schedule an appointment as soon as possible for a visit in 2 days  As needed, If symptoms worsen         Medication List      New Prescriptions    oseltamivir 75 MG capsule  Commonly known as: TAMIFLU  Take 1 capsule by mouth 2 (Two) Times a Day.           Where to Get Your Medications      These medications were sent to [facility]/pharmacy #[id number] - [place], [place] - [address] [facility] - [phone]  - [phone] FX  [address] [facility] [place] [address]    Phone: [phone]   · oseltamivir 75 MG capsule          Deandra Mesa PA  11/23/22 4387

## 2022-11-23 NOTE — DISCHARGE INSTRUCTIONS
Tylenol as needed for headache or fever  Take Tamiflu as prescribed for your flu symptoms    Follow-up with primary care for recheck  Recommend postponing Thanksgiving, rest, stay hydrated, drink plenty of fluids over the next few days.    Return to the ER for new or worsening symptoms

## 2022-11-25 LAB — QT INTERVAL: 378 MS

## 2023-03-02 ENCOUNTER — HOSPITAL ENCOUNTER (EMERGENCY)
Facility: HOSPITAL | Age: 38
Discharge: HOME | End: 2023-03-02
Attending: EMERGENCY MEDICINE | Admitting: EMERGENCY MEDICINE
Payer: COMMERCIAL

## 2023-03-02 ENCOUNTER — APPOINTMENT (OUTPATIENT)
Dept: GENERAL RADIOLOGY | Facility: HOSPITAL | Age: 38
End: 2023-03-02
Payer: COMMERCIAL

## 2023-03-02 ENCOUNTER — APPOINTMENT (OUTPATIENT)
Dept: CT IMAGING | Facility: HOSPITAL | Age: 38
End: 2023-03-02
Payer: COMMERCIAL

## 2023-03-02 VITALS
HEIGHT: 70 IN | OXYGEN SATURATION: 100 % | HEART RATE: 78 BPM | DIASTOLIC BLOOD PRESSURE: 81 MMHG | RESPIRATION RATE: 16 BRPM | WEIGHT: 175 LBS | TEMPERATURE: 97.9 F | BODY MASS INDEX: 25.05 KG/M2 | SYSTOLIC BLOOD PRESSURE: 113 MMHG

## 2023-03-02 DIAGNOSIS — Z86.16 HISTORY OF COVID-19: ICD-10-CM

## 2023-03-02 DIAGNOSIS — Z72.0 TOBACCO ABUSE: Primary | ICD-10-CM

## 2023-03-02 DIAGNOSIS — E88.01: ICD-10-CM

## 2023-03-02 LAB
ALBUMIN SERPL-MCNC: 4.6 G/DL (ref 3.5–5.2)
ALBUMIN/GLOB SERPL: 1.5 G/DL
ALP SERPL-CCNC: 62 U/L (ref 39–117)
ALT SERPL W P-5'-P-CCNC: 33 U/L (ref 1–33)
ANION GAP SERPL CALCULATED.3IONS-SCNC: 10 MMOL/L (ref 5–15)
AST SERPL-CCNC: 32 U/L (ref 1–32)
BASOPHILS # BLD AUTO: 0 10*3/MM3 (ref 0–0.2)
BASOPHILS NFR BLD AUTO: 0.5 % (ref 0–1.5)
BILIRUB SERPL-MCNC: 0.3 MG/DL (ref 0–1.2)
BUN SERPL-MCNC: 11 MG/DL (ref 6–20)
BUN/CREAT SERPL: 17.2 (ref 7–25)
CALCIUM SPEC-SCNC: 9.2 MG/DL (ref 8.6–10.5)
CHLORIDE SERPL-SCNC: 102 MMOL/L (ref 98–107)
CO2 SERPL-SCNC: 28 MMOL/L (ref 22–29)
CREAT SERPL-MCNC: 0.64 MG/DL (ref 0.57–1)
D DIMER PPP FEU-MCNC: 0.38 MG/L (FEU) (ref 0–0.5)
DEPRECATED RDW RBC AUTO: 41.6 FL (ref 37–54)
EGFRCR SERPLBLD CKD-EPI 2021: 118.4 ML/MIN/1.73
EOSINOPHIL # BLD AUTO: 0.1 10*3/MM3 (ref 0–0.4)
EOSINOPHIL NFR BLD AUTO: 1.7 % (ref 0.3–6.2)
ERYTHROCYTE [DISTWIDTH] IN BLOOD BY AUTOMATED COUNT: 13.2 % (ref 12.3–15.4)
GLOBULIN UR ELPH-MCNC: 3 GM/DL
GLUCOSE SERPL-MCNC: 97 MG/DL (ref 65–99)
HCT VFR BLD AUTO: 38.7 % (ref 34–46.6)
HGB BLD-MCNC: 12.7 G/DL (ref 12–15.9)
HOLD SPECIMEN: NORMAL
HOLD SPECIMEN: NORMAL
LYMPHOCYTES # BLD AUTO: 2.7 10*3/MM3 (ref 0.7–3.1)
LYMPHOCYTES NFR BLD AUTO: 59.9 % (ref 19.6–45.3)
MCH RBC QN AUTO: 29.6 PG (ref 26.6–33)
MCHC RBC AUTO-ENTMCNC: 32.9 G/DL (ref 31.5–35.7)
MCV RBC AUTO: 89.8 FL (ref 79–97)
MONOCYTES # BLD AUTO: 0.3 10*3/MM3 (ref 0.1–0.9)
MONOCYTES NFR BLD AUTO: 6.2 % (ref 5–12)
NEUTROPHILS NFR BLD AUTO: 1.5 10*3/MM3 (ref 1.7–7)
NEUTROPHILS NFR BLD AUTO: 31.7 % (ref 42.7–76)
NRBC BLD AUTO-RTO: 0 /100 WBC (ref 0–0.2)
NT-PROBNP SERPL-MCNC: <36 PG/ML (ref 0–450)
PLATELET # BLD AUTO: 217 10*3/MM3 (ref 140–450)
PMV BLD AUTO: 9.3 FL (ref 6–12)
POTASSIUM SERPL-SCNC: 3.9 MMOL/L (ref 3.5–5.2)
PROT SERPL-MCNC: 7.6 G/DL (ref 6–8.5)
RBC # BLD AUTO: 4.31 10*6/MM3 (ref 3.77–5.28)
SODIUM SERPL-SCNC: 140 MMOL/L (ref 136–145)
TROPONIN T SERPL HS-MCNC: <6 NG/L
WBC NRBC COR # BLD: 4.6 10*3/MM3 (ref 3.4–10.8)
WHOLE BLOOD HOLD COAG: NORMAL
WHOLE BLOOD HOLD SPECIMEN: NORMAL

## 2023-03-02 PROCEDURE — 25510000001 IOPAMIDOL PER 1 ML: Performed by: EMERGENCY MEDICINE

## 2023-03-02 PROCEDURE — 93005 ELECTROCARDIOGRAM TRACING: CPT | Performed by: NURSE PRACTITIONER

## 2023-03-02 PROCEDURE — 85379 FIBRIN DEGRADATION QUANT: CPT | Performed by: NURSE PRACTITIONER

## 2023-03-02 PROCEDURE — 99283 EMERGENCY DEPT VISIT LOW MDM: CPT

## 2023-03-02 PROCEDURE — 71045 X-RAY EXAM CHEST 1 VIEW: CPT

## 2023-03-02 PROCEDURE — 83880 ASSAY OF NATRIURETIC PEPTIDE: CPT | Performed by: NURSE PRACTITIONER

## 2023-03-02 PROCEDURE — 80053 COMPREHEN METABOLIC PANEL: CPT | Performed by: NURSE PRACTITIONER

## 2023-03-02 PROCEDURE — 84484 ASSAY OF TROPONIN QUANT: CPT | Performed by: NURSE PRACTITIONER

## 2023-03-02 PROCEDURE — 71275 CT ANGIOGRAPHY CHEST: CPT

## 2023-03-02 PROCEDURE — 85025 COMPLETE CBC W/AUTO DIFF WBC: CPT | Performed by: NURSE PRACTITIONER

## 2023-03-02 RX ORDER — METHYLPREDNISOLONE 4 MG/1
TABLET ORAL
Qty: 21 TABLET | Refills: 0 | Status: SHIPPED | OUTPATIENT
Start: 2023-03-02

## 2023-03-02 RX ORDER — SODIUM CHLORIDE 0.9 % (FLUSH) 0.9 %
10 SYRINGE (ML) INJECTION AS NEEDED
Status: DISCONTINUED | OUTPATIENT
Start: 2023-03-02 | End: 2023-03-02 | Stop reason: HOSPADM

## 2023-03-02 RX ADMIN — IOPAMIDOL 100 ML: 755 INJECTION, SOLUTION INTRAVENOUS at 09:36

## 2023-03-02 NOTE — ED PROVIDER NOTES
Subjective   History of Present Illness  37-year-old female presents with complaint of exertional dyspnea, fatigue, and arthralgias since Friday.  She reports testing positive for COVID on Saturday.  She reports this is her third time having COVID.  She has had J&J series to completion denies booster.  She reports history of alpha 1 and has oxygen available as needed.  She denies fever sweats chills.  She denies seeing her primary care for this nor taking steroids currently.    Primary care: Dr. Villeda        Review of Systems    Past Medical History:   Diagnosis Date   • Anemia    • Asthma        Allergies   Allergen Reactions   • Ketorolac Shortness Of Breath     Decreases lung function   • Morphine Itching and Swelling   • Ondansetron Hcl Other (See Comments)   • Topiramate Unknown (See Comments)     Kidney stones   • Butorphanol Anxiety       Past Surgical History:   Procedure Laterality Date   • APPENDECTOMY      [facility]    •  SECTION     •  SECTION     •  SECTION     • HYSTERECTOMY      [facility]   • LAPAROSCOPIC CHOLECYSTECTOMY     • SALPINGECTOMY      [facility]       Family History   Problem Relation Age of Onset   • Cancer Mother    • Heart disease Father        Social History     Socioeconomic History   • Marital status:    Tobacco Use   • Smoking status: Every Day     Packs/day: 0.40     Types: Cigarettes   • Smokeless tobacco: Never   Substance and Sexual Activity   • Alcohol use: Yes     Comment: rare   • Drug use: No   • Sexual activity: Defer           Objective   Physical Exam  Vitals and nursing note reviewed.   Constitutional:       General: She is awake. She is not in acute distress.     Appearance: Normal appearance. She is well-developed and normal weight. She is not ill-appearing, toxic-appearing or diaphoretic.   HENT:      Head: Normocephalic and atraumatic.      Mouth/Throat:      Mouth: Mucous membranes are moist.      Pharynx:  Oropharynx is clear.   Eyes:      Extraocular Movements: Extraocular movements intact.      Conjunctiva/sclera: Conjunctivae normal.      Pupils: Pupils are equal, round, and reactive to light.   Cardiovascular:      Rate and Rhythm: Regular rhythm. Tachycardia present.      Pulses: Normal pulses.           Radial pulses are 2+ on the right side and 2+ on the left side.        Dorsalis pedis pulses are 2+ on the right side and 2+ on the left side.        Posterior tibial pulses are 2+ on the right side and 2+ on the left side.      Heart sounds: Normal heart sounds, S1 normal and S2 normal. Heart sounds not distant. No murmur heard.    No friction rub. No gallop.   Pulmonary:      Effort: Pulmonary effort is normal. No respiratory distress.      Breath sounds: Normal breath sounds and air entry. No wheezing or rales.   Chest:      Chest wall: No tenderness.   Abdominal:      General: Bowel sounds are normal. There is no distension.      Palpations: Abdomen is soft. Abdomen is not rigid. There is no mass.      Tenderness: There is no abdominal tenderness. There is no guarding or rebound.      Hernia: No hernia is present.   Musculoskeletal:         General: No swelling or tenderness. Normal range of motion.      Cervical back: Normal range of motion and neck supple.   Skin:     General: Skin is warm and dry.      Capillary Refill: Capillary refill takes less than 2 seconds.      Coloration: Skin is not pale.      Findings: No erythema or rash.   Neurological:      Mental Status: She is alert and oriented to person, place, and time.      GCS: GCS eye subscore is 4. GCS verbal subscore is 5. GCS motor subscore is 6.   Psychiatric:         Mood and Affect: Mood normal.         Behavior: Behavior normal. Behavior is cooperative.         Thought Content: Thought content normal.         Judgment: Judgment normal.         Procedures     EKG interpretation: Sinus rhythm no ectopy rate of 77.  Compared to previous EKG from  "11/23/2022 sinus rhythm no ectopy rate of 95.  Reviewed by my attending, Dr. Fernandez.     PERC: 1         ED Course                                           Medical Decision Making  Alpha-1-proteinase inhibitor deficiency (HCC): acute illness or injury  History of COVID-19: acute illness or injury  Tobacco abuse: acute illness or injury  Amount and/or Complexity of Data Reviewed  Labs: ordered. Decision-making details documented in ED Course.  Radiology: ordered. Decision-making details documented in ED Course.  ECG/medicine tests: ordered. Decision-making details documented in ED Course.      Risk  Prescription drug management.        Interpreted by radiologist as below:     XR Chest 1 View    Result Date: 3/2/2023  Impression: No active disease Electronically Signed: Butch Molina  3/2/2023 9:31 AM EST  Workstation ID: [id number]    CT Angiogram Chest Pulmonary Embolism    Result Date: 3/2/2023  Impression: 1.Negative for pulmonary embolus. 2.No acute cardiopulmonary process. Electronically Signed: Ritesh Martinez  3/2/2023 9:51 AM EST  Workstation ID: [id number]        /81   Pulse 78   Temp 97.9 °F (36.6 °C) (Oral)   Resp 16   Ht 177.8 cm (70\")   Wt 79.4 kg (175 lb)   SpO2 100%   BMI 25.11 kg/m²      Lab Results (last 24 hours)     Procedure Component Value Units Date/Time    CBC & Differential [[id number]]  (Abnormal) Collected: 03/02/23 0905    Specimen: Blood Updated: 03/02/23 0915    Narrative:      The following orders were created for panel order CBC & Differential.  Procedure                               Abnormality         Status                     ---------                               -----------         ------                     CBC Auto Differential[[id number]]        Abnormal            Final result               Scan Slide[[id number]]                                                                    Please view results for these tests on the individual orders.    Comprehensive Metabolic " Panel [[id number]] Collected: 03/02/23 0905    Specimen: Blood Updated: 03/02/23 0943     Glucose 97 mg/dL      BUN 11 mg/dL      Creatinine 0.64 mg/dL      Sodium 140 mmol/L      Potassium 3.9 mmol/L      Chloride 102 mmol/L      CO2 28.0 mmol/L      Calcium 9.2 mg/dL      Total Protein 7.6 g/dL      Albumin 4.6 g/dL      ALT (SGPT) 33 U/L      AST (SGOT) 32 U/L      Alkaline Phosphatase 62 U/L      Total Bilirubin 0.3 mg/dL      Globulin 3.0 gm/dL      A/G Ratio 1.5 g/dL      BUN/Creatinine Ratio 17.2     Anion Gap 10.0 mmol/L      eGFR 118.4 mL/min/1.73     Narrative:      GFR Normal >60  Chronic Kidney Disease <60  Kidney Failure <15      BNP [[id number]]  (Normal) Collected: 03/02/23 0905    Specimen: Blood Updated: 03/02/23 0943     proBNP <36.0 pg/mL     Narrative:      Among patients with dyspnea, NT-proBNP is highly sensitive for the detection of acute congestive heart failure. In addition NT-proBNP of <300 pg/ml effectively rules out acute congestive heart failure with 99% negative predictive value.      Single High Sensitivity Troponin T [[id number]]  (Normal) Collected: 03/02/23 0905    Specimen: Blood Updated: 03/02/23 0943     HS Troponin T <6 ng/L     Narrative:      High Sensitive Troponin T Reference Range:  <10.0 ng/L- Negative Female for AMI  <15.0 ng/L- Negative Male for AMI  >=10 - Abnormal Female indicating possible myocardial injury.  >=15 - Abnormal Male indicating possible myocardial injury.   Clinicians would have to utilize clinical acumen, EKG, Troponin, and serial changes to determine if it is an Acute Myocardial Infarction or myocardial injury due to an underlying chronic condition.         D-dimer, Quantitative [[id number]]  (Normal) Collected: 03/02/23 0905    Specimen: Blood Updated: 03/02/23 0923     D-Dimer, Quantitative 0.38 mg/L (FEU)     Narrative:      According to the assay 's published package insert, a normal (<0.50 mg/L (FEU)) D-dimer result in conjunction with a  "non-high clinical probability assessment, excludes deep vein thrombosis (DVT) and pulmonary embolism (PE) with high sensitivity.    D-dimer values increase with age and this can make VTE exclusion of an older population difficult. To address this, the American College of Phys[employer], based on best available evidence and recent guidelines, recommends that clinicians use age-adjusted D-dimer thresholds in patients greater than 50 years of age with: a) a low probability of PE who do not meet all Pulmonary Embolism Rule Out Criteria, or b) in those with intermediate probability of PE.   The formula for an age-adjusted D-dimer cut-off is \"age/100\".  For example, a 60 year old patient would have an age-adjusted cut-off of 0.60 mg/L (FEU) and an 80 year old 0.80 mg/L (FEU).    CBC Auto Differential [[id number]]  (Abnormal) Collected: 03/02/23 0905    Specimen: Blood Updated: 03/02/23 0915     WBC 4.60 10*3/mm3      RBC 4.31 10*6/mm3      Hemoglobin 12.7 g/dL      Hematocrit 38.7 %      MCV 89.8 fL      MCH 29.6 pg      MCHC 32.9 g/dL      RDW 13.2 %      RDW-SD 41.6 fl      MPV 9.3 fL      Platelets 217 10*3/mm3      Neutrophil % 31.7 %      Lymphocyte % 59.9 %      Monocyte % 6.2 %      Eosinophil % 1.7 %      Basophil % 0.5 %      Neutrophils, Absolute 1.50 10*3/mm3      Lymphocytes, Absolute 2.70 10*3/mm3      Monocytes, Absolute 0.30 10*3/mm3      Eosinophils, Absolute 0.10 10*3/mm3      Basophils, Absolute 0.00 10*3/mm3      nRBC 0.0 /100 WBC            Medications   iopamidol (ISOVUE-370) 76 % injection 100 mL (100 mL Intravenous Given 3/2/23 0936)        Patient undressed and placed in gown for exam.  Appropriate PPE worn during patient exam.  Appropriate monitoring initiated. Patient is alert and oriented x3. No acute distress.  S1-S2 heart sounds on exam.  Lungs are clear to auscultation. No edema noted to the bilateral lower extremities.  IV established and labs obtained.  Cardiac work-up initiated.  Chest x-ray " obtained with the above findings. CBC, CMP, troponin, D-dimer, BNP unremarkable. CXR shows no acute findings. CT shows no PE. She was encouraged to stop smoking.     I reviewed chart 12/22/2021 patient was seen at [facility] for post epidural headache. My radiology interpretation CXR shows no infiltrates. Differential Diagnoses, not all-inclusive and does not constitute entirety of all causes: URI, ACS, PE. Patient was diagnosed with COVID recently and has URI. ACS ruled out by normal Troponin and no ST elevation on EKG. PE ruled out by labs and imaging.      Disposition/Treatment: Discussed results with patient, verbalized understanding. Discussed reasons to return to the ER, patient verbalized understanding. Agreeable with plan of care. Patient was stable upon discharge.    Upon reassessment, patient is flesh tone warm and dry no acute distress noted.  Vital signs are stable.    Part of this note may be an electronic transcription/translation of spoken language to printed text using the Dragon Dictation System.         Final diagnoses:   Tobacco abuse   Alpha-1-proteinase inhibitor deficiency (HCC)   History of COVID-19       ED Disposition  ED Disposition     ED Disposition   Discharge    Condition   Stable    Comment   --             Ran Villeda MD  [address] [facility]   [place] [id number] [phone]    Schedule an appointment as soon as possible for a visit       [facility] EMERGENCY DEPARTMENT  [address] [place] [id number] [phone]  Go to   If symptoms worsen         Medication List      New Prescriptions    methylPREDNISolone 4 MG dose pack  Commonly known as: MEDROL  Take as directed on package instructions.           Where to Get Your Medications      These medications were sent to [facility]pharmacy [facility][id number] - [place], [place] - [address] [facility] - [phone]  - [phone] FX  [address] [facility] [place] [address]    Phone: [phone]   · methylPREDNISolone 4  MG dose pack          Marsha Shepard, APRN  03/02/23 2153

## 2023-03-02 NOTE — DISCHARGE INSTRUCTIONS
Stop smoking.  Take steroids as directed.  Cover your cough from practice good hand hygiene.  Wipe hard surfaces down with Lysol and/or diluted bleach.  Take the following over-the-counter medications: Vitamin D 10,000 international units for 3 days, then 5000 international units daily, vitamin C 2000 mg daily, zinc 50 mg daily, quercetin 500 mg daily.  Schedule follow-up with primary care for recheck.  Return to the ER for new or worsening symptoms.

## 2023-03-05 LAB — QT INTERVAL: 420 MS

## 2023-03-30 ENCOUNTER — HOSPITAL ENCOUNTER (EMERGENCY)
Facility: HOSPITAL | Age: 38
Discharge: LEFT BEFORE DISCHARGE | End: 2023-03-30
Attending: EMERGENCY MEDICINE
Payer: COMMERCIAL

## 2023-03-30 PROCEDURE — 99211 OFF/OP EST MAY X REQ PHY/QHP: CPT | Performed by: EMERGENCY MEDICINE

## 2023-04-11 ENCOUNTER — HOSPITAL ENCOUNTER (EMERGENCY)
Facility: HOSPITAL | Age: 38
Discharge: HOME | End: 2023-04-11
Attending: EMERGENCY MEDICINE | Admitting: EMERGENCY MEDICINE
Payer: COMMERCIAL

## 2023-04-11 ENCOUNTER — APPOINTMENT (OUTPATIENT)
Dept: CT IMAGING | Facility: HOSPITAL | Age: 38
End: 2023-04-11
Payer: COMMERCIAL

## 2023-04-11 ENCOUNTER — APPOINTMENT (OUTPATIENT)
Dept: ULTRASOUND IMAGING | Facility: HOSPITAL | Age: 38
End: 2023-04-11
Payer: COMMERCIAL

## 2023-04-11 VITALS
OXYGEN SATURATION: 98 % | SYSTOLIC BLOOD PRESSURE: 109 MMHG | RESPIRATION RATE: 15 BRPM | HEIGHT: 70 IN | TEMPERATURE: 97.6 F | BODY MASS INDEX: 25.03 KG/M2 | DIASTOLIC BLOOD PRESSURE: 79 MMHG | WEIGHT: 174.82 LBS | HEART RATE: 71 BPM

## 2023-04-11 DIAGNOSIS — R10.84 GENERALIZED ABDOMINAL PAIN: Primary | ICD-10-CM

## 2023-04-11 LAB
ALBUMIN SERPL-MCNC: 4.8 G/DL (ref 3.5–5.2)
ALBUMIN/GLOB SERPL: 1.6 G/DL
ALP SERPL-CCNC: 55 U/L (ref 39–117)
ALT SERPL W P-5'-P-CCNC: 14 U/L (ref 1–33)
ANION GAP SERPL CALCULATED.3IONS-SCNC: 9 MMOL/L (ref 5–15)
AST SERPL-CCNC: 19 U/L (ref 1–32)
BASOPHILS # BLD AUTO: 0 10*3/MM3 (ref 0–0.2)
BASOPHILS NFR BLD AUTO: 0.4 % (ref 0–1.5)
BILIRUB SERPL-MCNC: 0.6 MG/DL (ref 0–1.2)
BILIRUB UR QL STRIP: NEGATIVE
BUN SERPL-MCNC: 8 MG/DL (ref 6–20)
BUN/CREAT SERPL: 14.5 (ref 7–25)
CALCIUM SPEC-SCNC: 9.1 MG/DL (ref 8.6–10.5)
CHLORIDE SERPL-SCNC: 102 MMOL/L (ref 98–107)
CLARITY UR: CLEAR
CO2 SERPL-SCNC: 28 MMOL/L (ref 22–29)
COLOR UR: YELLOW
CREAT SERPL-MCNC: 0.55 MG/DL (ref 0.57–1)
DEPRECATED RDW RBC AUTO: 43.8 FL (ref 37–54)
EGFRCR SERPLBLD CKD-EPI 2021: 122.8 ML/MIN/1.73
EOSINOPHIL # BLD AUTO: 0.2 10*3/MM3 (ref 0–0.4)
EOSINOPHIL NFR BLD AUTO: 3 % (ref 0.3–6.2)
ERYTHROCYTE [DISTWIDTH] IN BLOOD BY AUTOMATED COUNT: 14 % (ref 12.3–15.4)
GLOBULIN UR ELPH-MCNC: 3 GM/DL
GLUCOSE SERPL-MCNC: 90 MG/DL (ref 65–99)
GLUCOSE UR STRIP-MCNC: NEGATIVE MG/DL
HCT VFR BLD AUTO: 36.8 % (ref 34–46.6)
HGB BLD-MCNC: 11.8 G/DL (ref 12–15.9)
HGB UR QL STRIP.AUTO: NEGATIVE
KETONES UR QL STRIP: NEGATIVE
LEUKOCYTE ESTERASE UR QL STRIP.AUTO: NEGATIVE
LIPASE SERPL-CCNC: 36 U/L (ref 13–60)
LYMPHOCYTES # BLD AUTO: 3.4 10*3/MM3 (ref 0.7–3.1)
LYMPHOCYTES NFR BLD AUTO: 48.6 % (ref 19.6–45.3)
MCH RBC QN AUTO: 29.4 PG (ref 26.6–33)
MCHC RBC AUTO-ENTMCNC: 32.1 G/DL (ref 31.5–35.7)
MCV RBC AUTO: 91.7 FL (ref 79–97)
MONOCYTES # BLD AUTO: 0.4 10*3/MM3 (ref 0.1–0.9)
MONOCYTES NFR BLD AUTO: 5.8 % (ref 5–12)
NEUTROPHILS NFR BLD AUTO: 3 10*3/MM3 (ref 1.7–7)
NEUTROPHILS NFR BLD AUTO: 42.2 % (ref 42.7–76)
NITRITE UR QL STRIP: NEGATIVE
NRBC BLD AUTO-RTO: 0 /100 WBC (ref 0–0.2)
PH UR STRIP.AUTO: 7.5 [PH] (ref 5–8)
PLATELET # BLD AUTO: 280 10*3/MM3 (ref 140–450)
PMV BLD AUTO: 8.4 FL (ref 6–12)
POTASSIUM SERPL-SCNC: 3.6 MMOL/L (ref 3.5–5.2)
PROT SERPL-MCNC: 7.8 G/DL (ref 6–8.5)
PROT UR QL STRIP: NEGATIVE
RBC # BLD AUTO: 4.01 10*6/MM3 (ref 3.77–5.28)
SODIUM SERPL-SCNC: 139 MMOL/L (ref 136–145)
SP GR UR STRIP: 1.01 (ref 1–1.03)
UROBILINOGEN UR QL STRIP: NORMAL
WBC NRBC COR # BLD: 7.1 10*3/MM3 (ref 3.4–10.8)

## 2023-04-11 PROCEDURE — 80053 COMPREHEN METABOLIC PANEL: CPT | Performed by: PHYSICIAN ASSISTANT

## 2023-04-11 PROCEDURE — 85025 COMPLETE CBC W/AUTO DIFF WBC: CPT | Performed by: PHYSICIAN ASSISTANT

## 2023-04-11 PROCEDURE — 25510000001 IOPAMIDOL PER 1 ML: Performed by: EMERGENCY MEDICINE

## 2023-04-11 PROCEDURE — 96374 THER/PROPH/DIAG INJ IV PUSH: CPT

## 2023-04-11 PROCEDURE — 76705 ECHO EXAM OF ABDOMEN: CPT

## 2023-04-11 PROCEDURE — 25010000002 FENTANYL CITRATE (PF) 50 MCG/ML SOLUTION: Performed by: EMERGENCY MEDICINE

## 2023-04-11 PROCEDURE — 81003 URINALYSIS AUTO W/O SCOPE: CPT | Performed by: PHYSICIAN ASSISTANT

## 2023-04-11 PROCEDURE — 96375 TX/PRO/DX INJ NEW DRUG ADDON: CPT

## 2023-04-11 PROCEDURE — 99284 EMERGENCY DEPT VISIT MOD MDM: CPT

## 2023-04-11 PROCEDURE — 25010000002 METOCLOPRAMIDE PER 10 MG: Performed by: EMERGENCY MEDICINE

## 2023-04-11 PROCEDURE — 74177 CT ABD & PELVIS W/CONTRAST: CPT

## 2023-04-11 PROCEDURE — 83690 ASSAY OF LIPASE: CPT | Performed by: PHYSICIAN ASSISTANT

## 2023-04-11 RX ORDER — FENTANYL CITRATE 50 UG/ML
25 INJECTION, SOLUTION INTRAMUSCULAR; INTRAVENOUS ONCE
Status: COMPLETED | OUTPATIENT
Start: 2023-04-11 | End: 2023-04-11

## 2023-04-11 RX ORDER — SODIUM CHLORIDE 0.9 % (FLUSH) 0.9 %
10 SYRINGE (ML) INJECTION AS NEEDED
Status: DISCONTINUED | OUTPATIENT
Start: 2023-04-11 | End: 2023-04-12 | Stop reason: HOSPADM

## 2023-04-11 RX ORDER — FAMOTIDINE 10 MG/ML
20 INJECTION, SOLUTION INTRAVENOUS ONCE
Status: COMPLETED | OUTPATIENT
Start: 2023-04-11 | End: 2023-04-11

## 2023-04-11 RX ORDER — METOCLOPRAMIDE HYDROCHLORIDE 5 MG/ML
10 INJECTION INTRAMUSCULAR; INTRAVENOUS ONCE
Status: COMPLETED | OUTPATIENT
Start: 2023-04-11 | End: 2023-04-11

## 2023-04-11 RX ADMIN — SODIUM CHLORIDE, POTASSIUM CHLORIDE, SODIUM LACTATE AND CALCIUM CHLORIDE 1000 ML: 600; 310; 30; 20 INJECTION, SOLUTION INTRAVENOUS at 18:47

## 2023-04-11 RX ADMIN — FENTANYL CITRATE 25 MCG: 50 INJECTION, SOLUTION INTRAMUSCULAR; INTRAVENOUS at 18:47

## 2023-04-11 RX ADMIN — IOPAMIDOL 100 ML: 755 INJECTION, SOLUTION INTRAVENOUS at 21:15

## 2023-04-11 RX ADMIN — FAMOTIDINE 20 MG: 10 INJECTION INTRAVENOUS at 18:47

## 2023-04-11 RX ADMIN — METOCLOPRAMIDE HYDROCHLORIDE 10 MG: 5 INJECTION INTRAMUSCULAR; INTRAVENOUS at 18:47

## 2023-04-11 NOTE — ED PROVIDER NOTES
Subjective    Provider in Triage Note  Patient is a 37-year-old female presents to the ED with complaints of right-sided abdominal pain now radiating into her right mid and low back for the past week.  She states the pain has progressively gotten worse.  She reports associated nausea, vomiting, diarrhea, and constipation.  She denies any black or bloody stools.  No urinary complaints.  No reports of fever.  Patient states she was seen at [facility] last week with similar complaints had some labs and was discharged home.  Of note patient was on semagluitde injections for weight loss medication and states she had upped the dose just prior to the pain starting.  Patient states she has since stopped this medication with no improvement of her pain no injury.  Nothing makes it better or worse severe in nature goes into her back a bit.  No dysuria or frequency.  No one in the home with similar illness no foreign travels or antibiotic use or recent hospitalizations or injuries no chest pain neck arm jaw pain or shortness of breath      History of Present Illness  I agree with the provider in triage note has been reviewed        Review of Systems   Constitutional: Negative for chills and fever.   Respiratory: Negative for chest tightness and shortness of breath.    Cardiovascular: Negative for chest pain and palpitations.   Gastrointestinal: Positive for abdominal pain, nausea and vomiting. Negative for blood in stool.   Genitourinary: Negative for difficulty urinating and dysuria.   Musculoskeletal: Positive for back pain. Negative for neck pain.   Skin: Negative for rash and wound.   Neurological: Negative for dizziness and light-headedness.   Psychiatric/Behavioral: Negative for agitation and confusion.       Past Medical History:   Diagnosis Date   • Anemia    • Asthma        Allergies   Allergen Reactions   • Ketorolac Shortness Of Breath     Decreases lung function   • Morphine Itching and Swelling   • Ondansetron  Hcl Other (See Comments)   • Topiramate Unknown (See Comments)     Kidney stones   • Butorphanol Anxiety       Past Surgical History:   Procedure Laterality Date   • APPENDECTOMY  2018    [facility]    •  SECTION  2004   •  SECTION  2006   •  SECTION     • HYSTERECTOMY      [facility]   • LAPAROSCOPIC CHOLECYSTECTOMY     • SALPINGECTOMY  2018    [facility]       Family History   Problem Relation Age of Onset   • Cancer Mother    • Heart disease Father        Social History     Socioeconomic History   • Marital status:    Tobacco Use   • Smoking status: Every Day     Packs/day: 0.40     Types: Cigarettes   • Smokeless tobacco: Never   Substance and Sexual Activity   • Alcohol use: Yes     Comment: rare   • Drug use: No   • Sexual activity: Defer     Prior to Admission medications    Medication Sig Start Date End Date Taking? Authorizing Provider   ALPRAZolam (XANAX) 0.25 MG tablet As Needed. 19   Shane Rangel MD   amitriptyline (ELAVIL) 25 MG tablet  10/4/19   Shane Rangel MD   BROVANA 15 MCG/2ML nebulizer solution  20   Shane Rangel MD   CARAFATE 1 GM/10ML suspension  10/6/19   Shane Rangel MD   Cetirizine HCl (ZYRTEC ALLERGY PO) As Needed. 17   Shane Rangel MD   dicyclomine (BENTYL) 20 MG tablet  19   Shane Rangel MD   divalproex (DEPAKOTE) 500 MG DR tablet Take 500 mg by mouth 2 (Two) Times a Day. 10/6/20   Shane Rangel MD   DULoxetine (CYMBALTA) 60 MG capsule Take 60 mg by mouth Daily. 18   Shane Rangel MD   Erenumab-aooe (AIMOVIG) 70 MG/ML prefilled syringe Inject 70 mg under the skin into the appropriate area as directed. 3/6/19   Shane Rangel MD   HYDROcodone-acetaminophen (NORCO)  MG per tablet Take 1 tablet by mouth Every 6 (Six) Hours As Needed for Moderate Pain  for up to 20 doses. 22   Michael Haas MD   methylPREDNISolone (MEDROL) 4 MG dose pack  Take as directed on package instructions. 3/2/23   Marsha Shepard APRN   montelukast (SINGULAIR) 10 MG tablet TAKE 1 TABLET BY MOUTH EVERY DAY 1/18/18   Shane Rangel MD   oseltamivir (TAMIFLU) 75 MG capsule Take 1 capsule by mouth 2 (Two) Times a Day. 11/23/22   Deandra Mesa PA   OXTELLAR  MG tablet sustained-release 24 hour  10/3/19   Shane Rangel MD   promethazine (PHENERGAN) 25 MG tablet Take 25 mg by mouth Every 6 (Six) Hours As Needed. 6/7/18   Shane Rangel MD   theophylline (THEODUR) 300 MG 12 hr tablet TAKE 1 TABLET BY MOUTH EVERY DAY 2/6/18   Shane Rangel MD   tiotropium bromide-olodaterol (STIOLTO RESPIMAT) 2.5-2.5 MCG/ACT aerosol solution inhaler INHALE 2 PUFFS BY MOUTH EVERY DAY 1/16/18   Shane Rangel MD   traZODone (DESYREL) 100 MG tablet  7/2/20   Shane Rangel MD   trimethobenzamide (TIGAN) 300 MG capsule  10/6/19   Shane Rangel MD   zolpidem (AMBIEN) 5 MG tablet Take 5 mg by mouth.    ProviderShane MD       Patient is not on currently hydrocodone    Objective   Physical Exam  Constitutional awake alert no acute distress triage vital signs reviewed.  HEENT extraocular muscles are intact pupils equal round reactive sclera clear neck supple no adenopathy no JVD.  Lungs clear no retractions.  Back no CVA tenderness no spinal tenderness heart regular without murmur abdomen was soft with some midepigastric tenderness but no rebound or guarding its moderate degree no pulsatile masses good bowel sounds.  No enlarged liver or spleen extremities pulses equal upper and lower extremities no edema cords or Homans' sign no evidence of DVT.  Skin warm and dry without rashes or cellulitic changes.  Neurologic awake alert and follows commands monitorings normal without focal weakness  Procedures           ED Course      Results for orders placed or performed during the hospital encounter of 04/11/23   Comprehensive Metabolic Panel     Specimen: Arm, Left; Blood   Result Value Ref Range    Glucose 90 65 - 99 mg/dL    BUN 8 6 - 20 mg/dL    Creatinine 0.55 (L) 0.57 - 1.00 mg/dL    Sodium 139 136 - 145 mmol/L    Potassium 3.6 3.5 - 5.2 mmol/L    Chloride 102 98 - 107 mmol/L    CO2 28.0 22.0 - 29.0 mmol/L    Calcium 9.1 8.6 - 10.5 mg/dL    Total Protein 7.8 6.0 - 8.5 g/dL    Albumin 4.8 3.5 - 5.2 g/dL    ALT (SGPT) 14 1 - 33 U/L    AST (SGOT) 19 1 - 32 U/L    Alkaline Phosphatase 55 39 - 117 U/L    Total Bilirubin 0.6 0.0 - 1.2 mg/dL    Globulin 3.0 gm/dL    A/G Ratio 1.6 g/dL    BUN/Creatinine Ratio 14.5 7.0 - 25.0    Anion Gap 9.0 5.0 - 15.0 mmol/L    eGFR 122.8 >60.0 mL/min/1.73   Lipase    Specimen: Arm, Left; Blood   Result Value Ref Range    Lipase 36 13 - 60 U/L   Urinalysis With Microscopic If Indicated (No Culture) - Urine, Clean Catch    Specimen: Urine, Clean Catch   Result Value Ref Range    Color, UA Yellow Yellow, Straw    Appearance, UA Clear Clear    pH, UA 7.5 5.0 - 8.0    Specific Gravity, UA 1.015 1.005 - 1.030    Glucose, UA Negative Negative    Ketones, UA Negative Negative    Bilirubin, UA Negative Negative    Blood, UA Negative Negative    Protein, UA Negative Negative    Leuk Esterase, UA Negative Negative    Nitrite, UA Negative Negative    Urobilinogen, UA 0.2 E.U./dL 0.2 - 1.0 E.U./dL   CBC Auto Differential    Specimen: Arm, Left; Blood   Result Value Ref Range    WBC 7.10 3.40 - 10.80 10*3/mm3    RBC 4.01 3.77 - 5.28 10*6/mm3    Hemoglobin 11.8 (L) 12.0 - 15.9 g/dL    Hematocrit 36.8 34.0 - 46.6 %    MCV 91.7 79.0 - 97.0 fL    MCH 29.4 26.6 - 33.0 pg    MCHC 32.1 31.5 - 35.7 g/dL    RDW 14.0 12.3 - 15.4 %    RDW-SD 43.8 37.0 - 54.0 fl    MPV 8.4 6.0 - 12.0 fL    Platelets 280 140 - 450 10*3/mm3    Neutrophil % 42.2 (L) 42.7 - 76.0 %    Lymphocyte % 48.6 (H) 19.6 - 45.3 %    Monocyte % 5.8 5.0 - 12.0 %    Eosinophil % 3.0 0.3 - 6.2 %    Basophil % 0.4 0.0 - 1.5 %    Neutrophils, Absolute 3.00 1.70 - 7.00 10*3/mm3     Lymphocytes, Absolute 3.40 (H) 0.70 - 3.10 10*3/mm3    Monocytes, Absolute 0.40 0.10 - 0.90 10*3/mm3    Eosinophils, Absolute 0.20 0.00 - 0.40 10*3/mm3    Basophils, Absolute 0.00 0.00 - 0.20 10*3/mm3    nRBC 0.0 0.0 - 0.2 /100 WBC     CT Abdomen Pelvis With Contrast    Result Date: 4/11/2023  1.Status post cholecystectomy. Mild bile duct dilatation. This is unchanged. Correlate with bilirubin. 2.Moderate stool burden, question constipation. 3.Status post hysterectomy. Probable appendectomy. Electronically Signed: Diamond Cruz  4/11/2023 9:33 PM EDT  Workstation ID: [id number]    US Abdomen Limited    Result Date: 4/11/2023  Impression: Normal sonographic appearance of right upper quadrant patient status post cholecystectomy. Extrahepatic bile duct measures up to 8 mm. This is a normal finding for a postcholecystectomy patient. Correlate with bilirubin. Electronically Signed: Diamond Crzu  4/11/2023 10:37 PM EDT  Workstation ID: [id number]    Medications   fentaNYL citrate (PF) (SUBLIMAZE) injection 25 mcg (25 mcg Intravenous Given 4/11/23 1847)   metoclopramide (REGLAN) injection 10 mg (10 mg Intravenous Given 4/11/23 1847)   famotidine (PEPCID) injection 20 mg (20 mg Intravenous Given 4/11/23 1847)   lactated ringers bolus 1,000 mL (0 mL Intravenous Stopped 4/11/23 1917)   iopamidol (ISOVUE-370) 76 % injection 100 mL (100 mL Intravenous Given 4/11/23 2115)          Records reviewed from [facility] notes here brought with patient 3/30/2023.  Basic labs with a CBC and chemistries are unremarkable and the report from the CT was unremarkable                                  Medical Decision Making  Medical decision making.  IV established monitor placed sinus rhythm on my review liter lactated Ringer's patient given Pepcid 20 mg IV Reglan 10 mg IV and fentanyl 25 mics IV.  Labs obtained independent reviewed by me competence metabolic profile liver lipase unremarkable urine negative CBC unremarkable and hemoglobin 11.8.  CT  abdomen pelvis obtained and pending by me I do not see any acute bowel obstruction no evidence of ischemic bowel or perforation.  Radiology review of mild bile duct dilatation she had a cholecystectomy otherwise unremarkable some constipation noted hysterectomy appendectomy.  Ultrasound obtained and read by radiology report reviewed by me no acute findings.  She had some bile duct dilatation 8 mm but otherwise unremarkable and this is a normal finding with the status postcholecystectomy.  Record reviewed her notes reviewed from [facility] March 30, 2023 labs CBC and electrolytes unremarkable as well as CT.  This report reviewed by me.  On repeat exam she is feeling much better after the pain medicine and fluids.  We talked about the potential multiple etiologies abdominal pain I do not suspect a cardiac etiology based on her history physical and clinical findings.  Siderotic scar tissue adhesions peptic ulcer disease I do not see evidence of ischemic bowel see evidence of the bowel obstruction or perforation no evidence that suggest pyelonephritis or kidney stone this is not a complete list by any means of all causes of abdominal pain.  But she currently has an unremarkable work-up she did recently stop a weight loss drug after having some injections of that.  But has not improved.  She is currently stable she can follow-up in the office for further work-up also referred to GI for possible endoscopy.  We had long discussion about to return for repeat exam room was soft nontender good bowel sounds no peritoneal findings or any pulsatile masses vital signs stable stable unremarkable improved ER course.    Generalized abdominal pain: acute illness or injury  Amount and/or Complexity of Data Reviewed  External Data Reviewed: labs and radiology.  Labs: ordered. Decision-making details documented in ED Course.  Radiology: ordered and independent interpretation performed. Decision-making details documented in ED  Course.      Risk  Parenteral controlled substances.          Final diagnoses:   Generalized abdominal pain       ED Disposition  ED Disposition     ED Disposition   Discharge    Condition   Stable    Comment   --             Ran Villeda MD  [address]   [place] [facility]  [phone]    In 1 day           Medication List      No changes were made to your prescriptions during this visit.          Butch Aragon MD  04/12/23 3609

## 2023-04-21 ENCOUNTER — OFFICE (AMBULATORY)
Dept: URBAN - METROPOLITAN AREA CLINIC 64 | Facility: CLINIC | Age: 38
End: 2023-04-21

## 2023-04-21 VITALS
HEART RATE: 86 BPM | WEIGHT: 174 LBS | SYSTOLIC BLOOD PRESSURE: 120 MMHG | HEIGHT: 68 IN | DIASTOLIC BLOOD PRESSURE: 98 MMHG

## 2023-04-21 DIAGNOSIS — R11.2 NAUSEA WITH VOMITING, UNSPECIFIED: ICD-10-CM

## 2023-04-21 DIAGNOSIS — R10.11 RIGHT UPPER QUADRANT PAIN: ICD-10-CM

## 2023-04-21 DIAGNOSIS — K59.00 CONSTIPATION, UNSPECIFIED: ICD-10-CM

## 2023-04-21 PROCEDURE — 99214 OFFICE O/P EST MOD 30 MIN: CPT | Performed by: NURSE PRACTITIONER

## 2023-04-24 ENCOUNTER — TRANSCRIBE ORDERS (OUTPATIENT)
Dept: ADMINISTRATIVE | Facility: HOSPITAL | Age: 38
End: 2023-04-24
Payer: COMMERCIAL

## 2023-04-24 DIAGNOSIS — R10.11 ABDOMINAL PAIN, RIGHT UPPER QUADRANT: Primary | ICD-10-CM

## 2023-04-24 DIAGNOSIS — R11.2 NAUSEA AND VOMITING, UNSPECIFIED VOMITING TYPE: ICD-10-CM

## 2023-04-24 DIAGNOSIS — K59.00 CONSTIPATION, UNSPECIFIED CONSTIPATION TYPE: ICD-10-CM

## 2023-05-17 ENCOUNTER — HOSPITAL ENCOUNTER (EMERGENCY)
Facility: HOSPITAL | Age: 38
Discharge: HOME | End: 2023-05-18
Attending: EMERGENCY MEDICINE
Payer: COMMERCIAL

## 2023-05-17 ENCOUNTER — ANESTHESIA EVENT (OUTPATIENT)
Dept: EMERGENCY DEPT | Facility: HOSPITAL | Age: 38
End: 2023-05-17
Payer: COMMERCIAL

## 2023-05-17 ENCOUNTER — ANESTHESIA (OUTPATIENT)
Dept: EMERGENCY DEPT | Facility: HOSPITAL | Age: 38
End: 2023-05-17
Payer: COMMERCIAL

## 2023-05-17 VITALS
OXYGEN SATURATION: 98 % | DIASTOLIC BLOOD PRESSURE: 73 MMHG | HEIGHT: 70 IN | BODY MASS INDEX: 25.44 KG/M2 | TEMPERATURE: 98 F | HEART RATE: 76 BPM | RESPIRATION RATE: 12 BRPM | WEIGHT: 177.69 LBS | SYSTOLIC BLOOD PRESSURE: 109 MMHG

## 2023-05-17 DIAGNOSIS — G97.1 POSTDURAL PUNCTURE HEADACHE: Primary | ICD-10-CM

## 2023-05-17 PROCEDURE — 25010000002 METOCLOPRAMIDE PER 10 MG: Performed by: NURSE PRACTITIONER

## 2023-05-17 PROCEDURE — 99283 EMERGENCY DEPT VISIT LOW MDM: CPT

## 2023-05-17 PROCEDURE — 96374 THER/PROPH/DIAG INJ IV PUSH: CPT

## 2023-05-17 RX ORDER — DIVALPROEX SODIUM 500 MG/1
500 TABLET, DELAYED RELEASE ORAL ONCE
Status: COMPLETED | OUTPATIENT
Start: 2023-05-17 | End: 2023-05-17

## 2023-05-17 RX ORDER — CAFFEINE CITRATE 20 MG/ML
300 SOLUTION INTRAVENOUS ONCE
Status: DISCONTINUED | OUTPATIENT
Start: 2023-05-17 | End: 2023-05-17 | Stop reason: ALTCHOICE

## 2023-05-17 RX ORDER — CAFFEINE 200 MG
300 TABLET ORAL ONCE
Status: COMPLETED | OUTPATIENT
Start: 2023-05-17 | End: 2023-05-17

## 2023-05-17 RX ORDER — CAFFEINE CITRATE 20 MG/ML
10 SOLUTION INTRAVENOUS EVERY 24 HOURS
Status: DISCONTINUED | OUTPATIENT
Start: 2023-05-18 | End: 2023-05-17 | Stop reason: ALTCHOICE

## 2023-05-17 RX ORDER — BUTALBITAL, ACETAMINOPHEN AND CAFFEINE 50; 325; 40 MG/1; MG/1; MG/1
1 TABLET ORAL ONCE
Status: COMPLETED | OUTPATIENT
Start: 2023-05-17 | End: 2023-05-17

## 2023-05-17 RX ORDER — SODIUM CHLORIDE 0.9 % (FLUSH) 0.9 %
10 SYRINGE (ML) INJECTION AS NEEDED
Status: DISCONTINUED | OUTPATIENT
Start: 2023-05-17 | End: 2023-05-18 | Stop reason: HOSPADM

## 2023-05-17 RX ORDER — METOCLOPRAMIDE HYDROCHLORIDE 5 MG/ML
5 INJECTION INTRAMUSCULAR; INTRAVENOUS ONCE
Status: COMPLETED | OUTPATIENT
Start: 2023-05-17 | End: 2023-05-17

## 2023-05-17 RX ADMIN — BUTALBITAL, ACETAMINOPHEN, AND CAFFEINE 1 TABLET: 50; 325; 40 TABLET ORAL at 19:48

## 2023-05-17 RX ADMIN — SODIUM CHLORIDE 1000 ML: 9 INJECTION, SOLUTION INTRAVENOUS at 18:48

## 2023-05-17 RX ADMIN — SODIUM CHLORIDE 1000 ML: 9 INJECTION, SOLUTION INTRAVENOUS at 21:46

## 2023-05-17 RX ADMIN — CAFFEINE 300 MG: 200 TABLET ORAL at 19:48

## 2023-05-17 RX ADMIN — METOCLOPRAMIDE 5 MG: 5 INJECTION, SOLUTION INTRAMUSCULAR; INTRAVENOUS at 21:49

## 2023-05-17 RX ADMIN — DIVALPROEX SODIUM 500 MG: 500 TABLET, DELAYED RELEASE ORAL at 21:41

## 2023-05-17 NOTE — Clinical Note
[facility] EMERGENCY DEPARTMENT  [address] [place] [address]  Phone: [phone]    Brandi Martinez was seen and treated in our emergency department on 5/17/2023.  She may return to work on 05/18/2023.  Refrain from bending and physical exertion- change positions slowly.       Thank you for choosing [facility].    Micah De La Cruz MD
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
## 2023-05-18 NOTE — DISCHARGE INSTRUCTIONS
Change positions slowly and carefully.  Follow-up with your family doctor.  Or spinal specialist . return for any new or worsening symptoms

## 2023-05-18 NOTE — ANESTHESIA PROCEDURE NOTES
Blood Patch    Pre-sedation assessment completed: 5/17/2023 9:21 PM    Patient reassessed immediately prior to procedure    Patient location during procedure: ED  Blood patch placed: 5/17/2023 9:21 PM  Stop Time:5/17/2023 9:40 PM    Indications for Blood Patch: dural puncture and headache  Staff  Anesthesiologist: Brian Coelho MD  Preanesthetic Checklist  Completed: patient identified, IV checked, site marked, risks and benefits discussed, surgical consent, monitors and equipment checked, pre-op evaluation and timeout performed  Blood Patch Prep  Patient position: sitting  Sterile Tech: sterile barrier, cap, gloves and mask.  Prep: Betadine  Patient monitoring: EKG, continuous pulse ox and blood pressure monitoring  Location: L3-L4  Blood patch source: right antecubital IV.  Blood Patch Procedure  Sedation:no    Approach: midline   Guidance: palpation technique  Needle Gauge 17 G  Needle Type: Tuohy  Solution used: autologous blood  Loss of Resistance: 6 cm  Loss of Resistance Medium: saline  Blood Patch Source:venous    Amount injected: 15 mL  Assessment  Patient tolerance:patient tolerated the procedure well with no apparent complications  Complications:no  Additional Notes  Positional headache 5 days post LP.  States typical of her previous spinal headache after LP.    ?seizure episode (pt has hx of sz) at end of blood patch procedure.  Recovered immediately after lying flat.  Pt instructed to stay supine for next hour in ED.

## 2024-02-13 ENCOUNTER — APPOINTMENT (OUTPATIENT)
Dept: CT IMAGING | Facility: HOSPITAL | Age: 39
End: 2024-02-13
Payer: COMMERCIAL

## 2024-02-13 ENCOUNTER — HOSPITAL ENCOUNTER (EMERGENCY)
Facility: HOSPITAL | Age: 39
Discharge: HOME | End: 2024-02-13
Attending: EMERGENCY MEDICINE | Admitting: EMERGENCY MEDICINE
Payer: COMMERCIAL

## 2024-02-13 VITALS
OXYGEN SATURATION: 100 % | SYSTOLIC BLOOD PRESSURE: 127 MMHG | WEIGHT: 180 LBS | DIASTOLIC BLOOD PRESSURE: 79 MMHG | BODY MASS INDEX: 25.77 KG/M2 | HEART RATE: 72 BPM | RESPIRATION RATE: 18 BRPM | TEMPERATURE: 97.3 F | HEIGHT: 70 IN

## 2024-02-13 DIAGNOSIS — R06.02 EXERTIONAL SHORTNESS OF BREATH: ICD-10-CM

## 2024-02-13 DIAGNOSIS — J10.1 INFLUENZA B: Primary | ICD-10-CM

## 2024-02-13 LAB
ALBUMIN SERPL-MCNC: 4.7 G/DL (ref 3.5–5.2)
ALBUMIN/GLOB SERPL: 1.6 G/DL
ALP SERPL-CCNC: 58 U/L (ref 39–117)
ALT SERPL W P-5'-P-CCNC: 27 U/L (ref 1–33)
ANION GAP SERPL CALCULATED.3IONS-SCNC: 12 MMOL/L (ref 5–15)
AST SERPL-CCNC: 30 U/L (ref 1–32)
B PARAPERT DNA SPEC QL NAA+PROBE: NOT DETECTED
B PERT DNA SPEC QL NAA+PROBE: NOT DETECTED
BASOPHILS # BLD AUTO: 0 10*3/MM3 (ref 0–0.2)
BASOPHILS NFR BLD AUTO: 0.6 % (ref 0–1.5)
BILIRUB SERPL-MCNC: 0.3 MG/DL (ref 0–1.2)
BUN SERPL-MCNC: 9 MG/DL (ref 6–20)
BUN/CREAT SERPL: 14.8 (ref 7–25)
C PNEUM DNA NPH QL NAA+NON-PROBE: NOT DETECTED
CALCIUM SPEC-SCNC: 9.1 MG/DL (ref 8.6–10.5)
CHLORIDE SERPL-SCNC: 102 MMOL/L (ref 98–107)
CO2 SERPL-SCNC: 26 MMOL/L (ref 22–29)
CREAT SERPL-MCNC: 0.61 MG/DL (ref 0.57–1)
D DIMER PPP FEU-MCNC: 0.53 MG/L (FEU) (ref 0–0.5)
DEPRECATED RDW RBC AUTO: 45.9 FL (ref 37–54)
EGFRCR SERPLBLD CKD-EPI 2021: 119 ML/MIN/1.73
EOSINOPHIL # BLD AUTO: 0 10*3/MM3 (ref 0–0.4)
EOSINOPHIL NFR BLD AUTO: 0.6 % (ref 0.3–6.2)
ERYTHROCYTE [DISTWIDTH] IN BLOOD BY AUTOMATED COUNT: 14.7 % (ref 12.3–15.4)
FLUAV SUBTYP SPEC NAA+PROBE: NOT DETECTED
FLUBV RNA ISLT QL NAA+PROBE: DETECTED
GLOBULIN UR ELPH-MCNC: 2.9 GM/DL
GLUCOSE SERPL-MCNC: 79 MG/DL (ref 65–99)
HADV DNA SPEC NAA+PROBE: NOT DETECTED
HCOV 229E RNA SPEC QL NAA+PROBE: NOT DETECTED
HCOV HKU1 RNA SPEC QL NAA+PROBE: NOT DETECTED
HCOV NL63 RNA SPEC QL NAA+PROBE: NOT DETECTED
HCOV OC43 RNA SPEC QL NAA+PROBE: NOT DETECTED
HCT VFR BLD AUTO: 35.4 % (ref 34–46.6)
HGB BLD-MCNC: 11.8 G/DL (ref 12–15.9)
HMPV RNA NPH QL NAA+NON-PROBE: NOT DETECTED
HOLD SPECIMEN: NORMAL
HPIV1 RNA ISLT QL NAA+PROBE: NOT DETECTED
HPIV2 RNA SPEC QL NAA+PROBE: NOT DETECTED
HPIV3 RNA NPH QL NAA+PROBE: NOT DETECTED
HPIV4 P GENE NPH QL NAA+PROBE: NOT DETECTED
LYMPHOCYTES # BLD AUTO: 1.4 10*3/MM3 (ref 0.7–3.1)
LYMPHOCYTES NFR BLD AUTO: 29.5 % (ref 19.6–45.3)
M PNEUMO IGG SER IA-ACNC: NOT DETECTED
MCH RBC QN AUTO: 30.3 PG (ref 26.6–33)
MCHC RBC AUTO-ENTMCNC: 33.3 G/DL (ref 31.5–35.7)
MCV RBC AUTO: 91.1 FL (ref 79–97)
MONOCYTES # BLD AUTO: 0.5 10*3/MM3 (ref 0.1–0.9)
MONOCYTES NFR BLD AUTO: 10.6 % (ref 5–12)
NEUTROPHILS NFR BLD AUTO: 2.8 10*3/MM3 (ref 1.7–7)
NEUTROPHILS NFR BLD AUTO: 58.7 % (ref 42.7–76)
NRBC BLD AUTO-RTO: 0 /100 WBC (ref 0–0.2)
PLATELET # BLD AUTO: 205 10*3/MM3 (ref 140–450)
PMV BLD AUTO: 8.9 FL (ref 6–12)
POTASSIUM SERPL-SCNC: 4 MMOL/L (ref 3.5–5.2)
PROT SERPL-MCNC: 7.6 G/DL (ref 6–8.5)
RBC # BLD AUTO: 3.89 10*6/MM3 (ref 3.77–5.28)
RHINOVIRUS RNA SPEC NAA+PROBE: NOT DETECTED
RSV RNA NPH QL NAA+NON-PROBE: NOT DETECTED
SARS-COV-2 RNA NPH QL NAA+NON-PROBE: NOT DETECTED
SODIUM SERPL-SCNC: 140 MMOL/L (ref 136–145)
TROPONIN T SERPL HS-MCNC: <6 NG/L
WBC NRBC COR # BLD AUTO: 4.7 10*3/MM3 (ref 3.4–10.8)

## 2024-02-13 PROCEDURE — 84484 ASSAY OF TROPONIN QUANT: CPT

## 2024-02-13 PROCEDURE — 85379 FIBRIN DEGRADATION QUANT: CPT

## 2024-02-13 PROCEDURE — 93005 ELECTROCARDIOGRAM TRACING: CPT | Performed by: EMERGENCY MEDICINE

## 2024-02-13 PROCEDURE — 71275 CT ANGIOGRAPHY CHEST: CPT

## 2024-02-13 PROCEDURE — 99285 EMERGENCY DEPT VISIT HI MDM: CPT

## 2024-02-13 PROCEDURE — 0202U NFCT DS 22 TRGT SARS-COV-2: CPT

## 2024-02-13 PROCEDURE — 85025 COMPLETE CBC W/AUTO DIFF WBC: CPT

## 2024-02-13 PROCEDURE — 25510000001 IOPAMIDOL PER 1 ML: Performed by: EMERGENCY MEDICINE

## 2024-02-13 PROCEDURE — 80053 COMPREHEN METABOLIC PANEL: CPT

## 2024-02-13 RX ORDER — SODIUM CHLORIDE 0.9 % (FLUSH) 0.9 %
10 SYRINGE (ML) INJECTION AS NEEDED
Status: DISCONTINUED | OUTPATIENT
Start: 2024-02-13 | End: 2024-02-13 | Stop reason: HOSPADM

## 2024-02-13 RX ORDER — OSELTAMIVIR PHOSPHATE 75 MG/1
75 CAPSULE ORAL 2 TIMES DAILY
Qty: 10 CAPSULE | Refills: 0 | Status: SHIPPED | OUTPATIENT
Start: 2024-02-13

## 2024-02-13 RX ADMIN — IOPAMIDOL 100 ML: 755 INJECTION, SOLUTION INTRAVENOUS at 12:52

## 2024-02-14 LAB
QT INTERVAL: 399 MS
QTC INTERVAL: 484 MS

## 2024-03-06 NOTE — DISCHARGE INSTRUCTIONS
Push clear liquids    Use Bromfed for cough and congestion  Use prednisone for inflammation    Tylenol as needed for fever    Follow-up with Dr. Vidal for recheck in 3 days if not improving    Turn for increased work of breathing difficulty swallowing or shortness of breath   22